# Patient Record
Sex: FEMALE | Race: WHITE | Employment: OTHER | URBAN - METROPOLITAN AREA
[De-identification: names, ages, dates, MRNs, and addresses within clinical notes are randomized per-mention and may not be internally consistent; named-entity substitution may affect disease eponyms.]

---

## 2020-08-12 ENCOUNTER — HOSPITAL ENCOUNTER (OUTPATIENT)
Dept: LAB | Age: 80
Discharge: HOME | End: 2020-08-12
Payer: MEDICARE

## 2020-08-12 LAB
ALBUMIN SERPL-MCNC: 3.4 G/DL (ref 3.4–5)
ALBUMIN/GLOB SERPL: 0.8 {RATIO} (ref 0.8–1.7)
ALP SERPL-CCNC: 173 U/L (ref 45–117)
ALT SERPL-CCNC: 49 U/L (ref 13–56)
ANION GAP SERPL CALC-SCNC: 1 MMOL/L (ref 3–18)
AST SERPL-CCNC: 37 U/L (ref 10–38)
BASOPHILS # BLD: 0 K/UL (ref 0–0.1)
BASOPHILS NFR BLD: 1 % (ref 0–2)
BILIRUB SERPL-MCNC: 0.4 MG/DL (ref 0.2–1)
BUN SERPL-MCNC: 18 MG/DL (ref 7–18)
BUN/CREAT SERPL: 18 (ref 12–20)
CALCIUM SERPL-MCNC: 9.6 MG/DL (ref 8.5–10.1)
CHLORIDE SERPL-SCNC: 100 MMOL/L (ref 100–111)
CO2 SERPL-SCNC: 37 MMOL/L (ref 21–32)
CREAT SERPL-MCNC: 1.01 MG/DL (ref 0.6–1.3)
DIFFERENTIAL METHOD BLD: ABNORMAL
EOSINOPHIL # BLD: 0.8 K/UL (ref 0–0.4)
EOSINOPHIL NFR BLD: 13 % (ref 0–5)
ERYTHROCYTE [DISTWIDTH] IN BLOOD BY AUTOMATED COUNT: 13.8 % (ref 11.6–14.5)
GLOBULIN SER CALC-MCNC: 4.4 G/DL (ref 2–4)
GLUCOSE SERPL-MCNC: 88 MG/DL (ref 74–99)
HCT VFR BLD AUTO: 41.3 % (ref 35–45)
HGB BLD-MCNC: 12.5 G/DL (ref 12–16)
INR PPP: 1 (ref 0.8–1.2)
LYMPHOCYTES # BLD: 1 K/UL (ref 0.9–3.6)
LYMPHOCYTES NFR BLD: 17 % (ref 21–52)
MCH RBC QN AUTO: 31.1 PG (ref 24–34)
MCHC RBC AUTO-ENTMCNC: 30.3 G/DL (ref 31–37)
MCV RBC AUTO: 102.7 FL (ref 74–97)
MONOCYTES # BLD: 0.7 K/UL (ref 0.05–1.2)
MONOCYTES NFR BLD: 11 % (ref 3–10)
NEUTS SEG # BLD: 3.4 K/UL (ref 1.8–8)
NEUTS SEG NFR BLD: 58 % (ref 40–73)
PLATELET # BLD AUTO: 164 K/UL (ref 135–420)
PMV BLD AUTO: 11.8 FL (ref 9.2–11.8)
POTASSIUM SERPL-SCNC: 4.1 MMOL/L (ref 3.5–5.5)
PROT SERPL-MCNC: 7.8 G/DL (ref 6.4–8.2)
PROTHROMBIN TIME: 13.5 SEC (ref 11.5–15.2)
RBC # BLD AUTO: 4.02 M/UL (ref 4.2–5.3)
SODIUM SERPL-SCNC: 138 MMOL/L (ref 136–145)
WBC # BLD AUTO: 5.9 K/UL (ref 4.6–13.2)

## 2020-08-12 PROCEDURE — 36415 COLL VENOUS BLD VENIPUNCTURE: CPT

## 2020-08-12 PROCEDURE — 85610 PROTHROMBIN TIME: CPT

## 2020-08-12 PROCEDURE — 80053 COMPREHEN METABOLIC PANEL: CPT

## 2020-08-12 PROCEDURE — 85025 COMPLETE CBC W/AUTO DIFF WBC: CPT

## 2020-08-17 RX ORDER — UMECLIDINIUM 62.5 UG/1
1 AEROSOL, POWDER ORAL DAILY
COMMUNITY

## 2020-08-17 RX ORDER — GLUCOSAMINE SULFATE 1500 MG
1000 POWDER IN PACKET (EA) ORAL DAILY
COMMUNITY

## 2020-08-17 RX ORDER — HYDROCORTISONE 25 MG/G
CREAM TOPICAL 2 TIMES DAILY
COMMUNITY

## 2020-08-18 ENCOUNTER — HOSPITAL ENCOUNTER (OUTPATIENT)
Age: 80
Setting detail: OUTPATIENT SURGERY
Discharge: HOME | End: 2020-08-18
Attending: INTERNAL MEDICINE | Admitting: INTERNAL MEDICINE
Payer: MEDICARE

## 2020-08-18 VITALS
TEMPERATURE: 97.7 F | HEIGHT: 59 IN | SYSTOLIC BLOOD PRESSURE: 167 MMHG | OXYGEN SATURATION: 97 % | RESPIRATION RATE: 18 BRPM | HEART RATE: 74 BPM | WEIGHT: 169 LBS | BODY MASS INDEX: 34.07 KG/M2 | DIASTOLIC BLOOD PRESSURE: 54 MMHG

## 2020-08-18 DIAGNOSIS — I20.9 ANGINA, CLASS III (HCC): ICD-10-CM

## 2020-08-18 DIAGNOSIS — R94.39 ABNORMAL STRESS TEST: ICD-10-CM

## 2020-08-18 PROCEDURE — 77030013797 HC KT TRNSDUC PRSSR EDWD -A: Performed by: INTERNAL MEDICINE

## 2020-08-18 PROCEDURE — C1769 GUIDE WIRE: HCPCS | Performed by: INTERNAL MEDICINE

## 2020-08-18 PROCEDURE — 77030029997 HC DEV COM RDL R BND TELE -B: Performed by: INTERNAL MEDICINE

## 2020-08-18 PROCEDURE — 93460 R&L HRT ART/VENTRICLE ANGIO: CPT | Performed by: INTERNAL MEDICINE

## 2020-08-18 PROCEDURE — C1894 INTRO/SHEATH, NON-LASER: HCPCS | Performed by: INTERNAL MEDICINE

## 2020-08-18 PROCEDURE — 99153 MOD SED SAME PHYS/QHP EA: CPT | Performed by: INTERNAL MEDICINE

## 2020-08-18 PROCEDURE — 74011636320 HC RX REV CODE- 636/320: Performed by: INTERNAL MEDICINE

## 2020-08-18 PROCEDURE — 99152 MOD SED SAME PHYS/QHP 5/>YRS: CPT | Performed by: INTERNAL MEDICINE

## 2020-08-18 PROCEDURE — 74011250636 HC RX REV CODE- 250/636: Performed by: INTERNAL MEDICINE

## 2020-08-18 PROCEDURE — 74011000250 HC RX REV CODE- 250: Performed by: INTERNAL MEDICINE

## 2020-08-18 PROCEDURE — 77030008543 HC TBNG MON PRSS MRTM -A: Performed by: INTERNAL MEDICINE

## 2020-08-18 PROCEDURE — C1751 CATH, INF, PER/CENT/MIDLINE: HCPCS | Performed by: INTERNAL MEDICINE

## 2020-08-18 PROCEDURE — 77030004522 HC CATH ANGI DX EXPO BSC -A: Performed by: INTERNAL MEDICINE

## 2020-08-18 RX ORDER — HEPARIN SODIUM 1000 [USP'U]/ML
INJECTION, SOLUTION INTRAVENOUS; SUBCUTANEOUS AS NEEDED
Status: DISCONTINUED | OUTPATIENT
Start: 2020-08-18 | End: 2020-08-18 | Stop reason: HOSPADM

## 2020-08-18 RX ORDER — FENTANYL CITRATE 50 UG/ML
INJECTION, SOLUTION INTRAMUSCULAR; INTRAVENOUS AS NEEDED
Status: DISCONTINUED | OUTPATIENT
Start: 2020-08-18 | End: 2020-08-18 | Stop reason: HOSPADM

## 2020-08-18 RX ORDER — LIDOCAINE HYDROCHLORIDE 10 MG/ML
INJECTION INFILTRATION; PERINEURAL AS NEEDED
Status: DISCONTINUED | OUTPATIENT
Start: 2020-08-18 | End: 2020-08-18 | Stop reason: HOSPADM

## 2020-08-18 RX ORDER — SODIUM CHLORIDE 9 MG/ML
75 INJECTION, SOLUTION INTRAVENOUS CONTINUOUS
Status: DISPENSED | OUTPATIENT
Start: 2020-08-18 | End: 2020-08-18

## 2020-08-18 RX ORDER — HEPARIN SODIUM 200 [USP'U]/100ML
INJECTION, SOLUTION INTRAVENOUS
Status: COMPLETED | OUTPATIENT
Start: 2020-08-18 | End: 2020-08-18

## 2020-08-18 RX ORDER — SODIUM CHLORIDE 0.9 % (FLUSH) 0.9 %
5-40 SYRINGE (ML) INJECTION AS NEEDED
Status: CANCELLED | OUTPATIENT
Start: 2020-08-18

## 2020-08-18 RX ORDER — VERAPAMIL HYDROCHLORIDE 2.5 MG/ML
INJECTION, SOLUTION INTRAVENOUS AS NEEDED
Status: DISCONTINUED | OUTPATIENT
Start: 2020-08-18 | End: 2020-08-18 | Stop reason: HOSPADM

## 2020-08-18 RX ORDER — SODIUM CHLORIDE 0.9 % (FLUSH) 0.9 %
5-40 SYRINGE (ML) INJECTION EVERY 8 HOURS
Status: CANCELLED | OUTPATIENT
Start: 2020-08-18

## 2020-08-18 RX ADMIN — SODIUM CHLORIDE 75 ML/HR: 900 INJECTION, SOLUTION INTRAVENOUS at 08:47

## 2020-08-18 NOTE — PROCEDURES
LHC and Coronary angiogram done via left radial approach. RHC done via right femoral vein approach. Coronary anatomy described below with noted coronary artery disease. Coronary angiogram revealed mild CAD with calcified coronaries. LV gram was not done. LVEDP 18-20 mm hg. No significant gradient on pull back. RHC revealed PCWP 23 mm hg, mean PA pressure 51 mm hg, RV pressure 84/2/17 mm hg and mean RA pressure 13 mm hg.    Cardiac out put by Ren's method 3.66 l/min and PVR 6.83 woods unit. Patient tolerated procedure well. Scant blood loss. No complications. No specimen removed. Recommendation:    Medication considered:   Aspirin, beta blocker, ACEI, Nitrates and statin     Will add diuretics. Continue oxygen. CAD risk factor education  Diet education  Control cholesterol  Blood pressure control  Exercise education: Age and functional status appropriate. Strongly advised to refrain from smoking.

## 2020-08-18 NOTE — Clinical Note
Bilateral groin and left radial clipped prepped with ChloraPrep and draped. Wet prep elapsed drying time: 5 mins.

## 2020-08-18 NOTE — H&P
Date of Surgery Update:  Tanesha Baez was seen and examined. History and physical has been reviewed. The patient has been examined. There have been no significant clinical changes since the completion of the originally dated History and Physical.      Patient assessed and is candidate for moderate sedation.       Signed By: Humera Hayes MD     August 18, 2020 10:41 AM

## 2020-08-18 NOTE — Clinical Note
TRANSFER - IN REPORT:     Verbal report received from: Vladislav Taylor. Report consisted of patient's Situation, Background, Assessment and   Recommendations(SBAR). Opportunity for questions and clarification was provided. Assessment completed upon patient's arrival to unit and care assumed. Patient transported with a Cardiac Cath Tech / Patient Care Tech.

## 2020-08-18 NOTE — DISCHARGE SUMMARY
Discharge Summary    Patient: Mi Haynes MRN: [id number]  CSN: [id number]    YOB: 1940  Age: 67 y.o. Sex: female    DOA: 8/18/2020 LOS:  LOS: 0 days   Discharge Date:      Primary Care Provider:  Dennis SALAZAR DO    Admission Diagnoses: Angina, class III ([address] [I20.9]  Abnormal stress test [R94.39]    Discharge Diagnoses:  CAD, Pulmonary hypertension     Discharge Condition: Stable    Discharge Medications:     Current Discharge Medication List      CONTINUE these medications which have NOT CHANGED    Details   cyanocobalamin, vitamin B-12, (VITAMIN B-12 SL) 1,000 mcg by SubLINGual route daily. cholecalciferol (Vitamin D3) 25 mcg (1,000 unit) cap Take 1,000 Units by mouth daily. allopurinol (ZYLOPRIM) 300 mg tablet Take 300 mg by mouth daily. Indications: GOUT      triamterene-hydrochlorothiazide (MAXZIDE) 37.5-25 mg per tablet Take  by mouth daily. Pt instructed to take am of surgery. Indications: HYPERTENSION      metoprolol (LOPRESSOR) 25 mg tablet Take 25 mg by mouth two (2) times a day. Pt instructed to take am of surgery. Indications: high blood pressure, succinate      verapamil SR (CALAN-SR) 240 mg CR tablet Take 240 mg by mouth daily. Pt instructed to take am of surgery. Indications: HYPERTENSION      aspirin delayed-release 81 mg tablet Take 81 mg by mouth daily. cloNIDine HCl (CATAPRES) 0.2 mg tablet Take 0.2 mg by mouth daily. Indications: HYPERTENSION      umeclidinium (Incruse Ellipta) 62.5 mcg/actuation inhaler Take 1 Puff by inhalation daily. hydrocortisone (Proctosol HC) 2.5 % rectal cream Insert  into rectum two (2) times a day. zolpidem (AMBIEN) 10 mg tablet Take 10 mg by mouth nightly as needed for Sleep. Indications: SLEEP-ONSET INSOMNIA      ezetimibe-simvastatin (VYTORIN 10/40) 10-40 mg per tablet Take 1 tablet by mouth nightly.  Indications: HYPERCHOLESTEROLEMIA             Procedures : LHC, RHC, coronary angiogram +/- PCI    Consults: None      PHYSICAL EXAM   Visit Vitals  /77 (BP 1 Location: Right arm, BP Patient Position: At rest)   Pulse 72   Temp 98.7 °F (37.1 °C)   Resp 22   Ht 4' 11\" (1.499 m)   Wt 76.7 kg (169 lb)   SpO2 91%   Breastfeeding No   BMI 34.13 kg/m²     General: Awake, cooperative, no acute distress    HEENT: NC, Atraumatic. PERRLA, EOMI. Anicteric sclerae. Lungs:  CTA Bilaterally. No Wheezing/Rhonchi/Rales. Heart:  Regular  rhythm,  No murmur, No Rubs, No Gallops  Abdomen: Soft, Non distended, Non tender. +Bowel sounds,   Extremities: No c/c/e  Psych:   Not anxious or agitated. Neurologic:  No acute neurological deficits. Admission HPI : See H/P    Hospital Course : Patient came for out patient LHC, RHC and coronary angiogram.  LHC and Coronary angiogram done via left radial approach. RHC done via right femoral vein approach. Coronary anatomy described below with noted coronary artery disease. Coronary angiogram revealed mild CAD with calcified coronaries. LV gram was not done. LVEDP 18-20 mm hg. No significant gradient on pull back. RHC revealed PCWP 23 mm hg, mean PA pressure 51 mm hg, RV pressure 84/2/17 mm hg and mean RA pressure 13 mm hg.    Cardiac out put by Ren's method 3.66 l/min and PVR 6.83 woods unit. Patient tolerated procedure well. Scant blood loss. No complications. No specimen removed. Recommendation:    Medication considered:   Aspirin, beta blocker, ACEI, Nitrates and statin     Will add diuretics. Continue oxygen. CAD risk factor education  Diet education  Control cholesterol  Blood pressure control  Exercise education: Age and functional status appropriate. Strongly advised to refrain from smoking. Activity: No driving for 24 hours, no weight lifting no more than 10 lbs from left hand for 1 week. Diet: Cardiac     Follow-up:  In cardiology clinic after 2 weeks    Disposition: Home    Minutes spent on discharge:41 minutes        Labs: Results:       Chemistry No results for input(s): GLU, NA, K, CL, CO2, BUN, CREA, CA, AGAP, BUCR, TBIL, AP, TP, ALB, GLOB, AGRAT in the last 72 hours. No lab exists for component: GPT   CBC w/Diff No results for input(s): WBC, RBC, HGB, HCT, PLT, GRANS, LYMPH, EOS, HGBEXT, HCTEXT, PLTEXT in the last 72 hours. Cardiac Enzymes No results for input(s): CPK, CKND1, BRENDAN in the last 72 hours. No lab exists for component: CKRMB, TROIP   Coagulation No results for input(s): PTP, INR, APTT, INREXT in the last 72 hours. Lipid Panel No results found for: CHOL, CHOLPOCT, CHOLX, CHLST, CHOLV, [id number], HDL, HDLP, LDL, LDLC, DLDLP, [id number], VLDLC, VLDL, TGLX, TRIGL, TRIGP, TGLPOCT, CHHD, CHHDX   BNP No results for input(s): BNPP in the last 72 hours. Liver Enzymes No results for input(s): TP, ALB, TBIL, AP in the last 72 hours. No lab exists for component: SGOT, GPT, DBIL   Thyroid Studies No results found for: T4, T3U, TSH, TSHEXT         Significant Diagnostic Studies: No results found.           CC: Shayy Rock DO

## 2020-08-18 NOTE — DISCHARGE INSTRUCTIONS
DISCHARGE SUMMARY from Nurse    PATIENT INSTRUCTIONS:    After general anesthesia or intravenous sedation, for 24 hours or while taking prescription Narcotics:  · Limit your activities  · Do not drive and operate hazardous machinery  · Do not make important personal or business decisions  · Do  not drink alcoholic beverages  · If you have not urinated within 8 hours after discharge, please contact your surgeon on call. Report the following to your surgeon:  · Excessive pain, swelling, redness or odor of or around the surgical area  · Temperature over 100.5  · Nausea and vomiting lasting longer than 4 hours or if unable to take medications  · Any signs of decreased circulation or nerve impairment to extremity: change in color, persistent  numbness, tingling, coldness or increase pain  · Any questions    What to do at Home:  Recommended activity:     If you experience any of the following symptoms , please follow up with Dr. Franci Zamorano. *  Please give a list of your current medications to your Primary Care Provider. *  Please update this list whenever your medications are discontinued, doses are      changed, or new medications (including over-the-counter products) are added. *  Please carry medication information at all times in case of emergency situations. These are general instructions for a healthy lifestyle:    No smoking/ No tobacco products/ Avoid exposure to second hand smoke  Surgeon General's Warning:  Quitting smoking now greatly reduces serious risk to your health.     Obesity, smoking, and sedentary lifestyle greatly increases your risk for illness    A healthy diet, regular physical exercise & weight monitoring are important for maintaining a healthy lifestyle    You may be retaining fluid if you have a history of heart failure or if you experience any of the following symptoms:  Weight gain of 3 pounds or more overnight or 5 pounds in a week, increased swelling in our hands or feet or shortness of breath while lying flat in bed. Please call your doctor as soon as you notice any of these symptoms; do not wait until your next office visit. The discharge information has been reviewed with the patient and spouse. The patient and caregiver verbalized understanding. Discharge medications reviewed with the patient and caregiver and appropriate educational materials and side effects teaching were provided. ___________________________________________________________________________________________________________________________________                 Cardiac Catheterization/Angiography Discharge Instructions    *Check the puncture site frequently for swelling or bleeding. If you see any bleeding, lie down and apply pressure over the area with a clean towel or washcloth. Notify your doctor for any redness, swelling, drainage or oozing from the puncture site. Notify your doctor for any fever or chills. *If the leg or arm with the puncture becomes cold, numb or painful, call Dr Calles     *Activity should be limited for the next 48 hours. Climb stairs as little as possible and avoid any stooping, bending or strenuous activity for 48 hours. No heavy lifting (anything over 10 pounds) for three days. *Do not drive for 24 hours. *You may resume your usual diet. Drink more fluids than usual.    *Have a responsible person drive you home and stay with you for at least 24 hours after your heart catheterization/angiography. Wear armboard rest of today & overnight, discard armboard in the morning. *You may remove the bandage from your Right, Left, Groin and Arm in 24 hours. You may shower in 24 hours. No tub baths, hot tubs or swimming for one week. Do not place any lotions, creams, powders, ointments over the puncture site for one week. You may place a clean band-aid over the puncture site each day for 5 days. Change this daily.       Patient armband removed and shredded

## 2020-08-18 NOTE — ROUTINE PROCESS
Cardiac Cath Lab:  Pre Procedure Chart Check Patients chart was accessed and reviewed for possible and/or scheduled procedure. Creatinine Clearance: 
Serum creatinine: 1.01 mg/dL 08/12/20 1523 Estimated creatinine clearance: 43.5 mL/min Total Contrast  Load: 
3 x estimated clearance amount=  130.5ml 
 
75% of Contrast Load: 0.75 x Total Contrast Load=    97.8ml No results for input(s): WBC, RBC, HCT, HGB, PLT, INR, APTT, PTP, NA, K, BUN, CREA, GFRAA, GFRNA, CA, MAG, CPK, CKMB, CKNDX, CKND1, TROPT, TROIQ, BNPP, BNP, HCTEXT, HGBEXT, PLTEXT, INREXT in the last 72 hours. No lab exists for component: DDIMER, GLUCOSE 
 
BMI: Body mass index is 34.13 kg/m². ALLERGIES:  
Allergies Allergen Reactions  Benicar [Olmesartan] Diarrhea and Nausea and Vomiting  Sulfa (Sulfonamide Antibiotics) Hives Lines: 
  
  
  
  
 
History: 
 
Past Medical History:  
Diagnosis Date  Arthritis  Chronic obstructive pulmonary disease [address] [address]  Gout  Hypercholesterolemia  Hypertension 1971  Ill-defined condition   
 shortness of breath with exercise  Tinnitus  Unspecified sleep apnea   
 does not use CPAP Past Surgical History:  
Procedure Laterality Date  HX BREAST BIOPSY Left  HX CATARACT REMOVAL Bilateral 2012  HX DILATION AND CURETTAGE    
 HX KNEE REPLACEMENT Left 2017 Patient Active Problem List  
Diagnosis Code  Unspecified cataract H26.9

## 2020-08-18 NOTE — PROGRESS NOTES
Back from procedure. TR band intact to left wrist & right femoral venous sheath intact. Pt flat, denies pain Diet given. 1245 Right femoral venous sheath removed, pressure held x 15 minutes, sterile 2x2 & Tegaderm applied. 1305 TR band released, sterile 2x2 & Tegaderm applied with armboard. Dr. Lizarraga Shone in.   1345 pt voided 200 ml on bedpan, Discharge instructions reviewed. 1430 Bedside report to OLIVIA Strange RN

## 2020-08-18 NOTE — Clinical Note
TRANSFER - OUT REPORT:     Verbal report given to: Michael. Report consisted of patient's Situation, Background, Assessment and   Recommendations(SBAR). Opportunity for questions and clarification was provided. Patient transported with a Cardiac Cath Tech / Patient Care Tech. Patient transported to: Care.

## 2020-08-18 NOTE — PROGRESS NOTES
Denies any CP at this time. Slowly  Adjusted HOB to 45 degree  W/o changes to R groin assessment. Left wrist with immobilizer in use at all times. Assisted pt from high cabrera position to sitting up at the edge of the bed w/o problem. O2 on at all times. Pt able to changed clothes with minimal assist . Stood up and ambulated w/o dizziness or CP. escorted via w/c will all her belongings. Face - face d/c instruction given to son- that includes diet, activity limitation, showering tomorrow and changing dressing with bandaid. Agreed with no driving, no signing documents. And no alcohol inatke today. F/u with Doctor Rodney Wayne.

## 2021-04-22 ENCOUNTER — TRANSCRIBE ORDER (OUTPATIENT)
Dept: REGISTRATION | Age: 81
End: 2021-04-22

## 2021-04-22 ENCOUNTER — HOSPITAL ENCOUNTER (OUTPATIENT)
Dept: LAB | Age: 81
Discharge: HOME | End: 2021-04-22
Payer: MEDICARE

## 2021-04-22 DIAGNOSIS — E78.00 PURE HYPERCHOLESTEROLEMIA: Primary | ICD-10-CM

## 2021-04-22 DIAGNOSIS — E78.00 PURE HYPERCHOLESTEROLEMIA: ICD-10-CM

## 2021-04-22 LAB
ALBUMIN SERPL-MCNC: 3.4 G/DL (ref 3.4–5)
ALBUMIN/GLOB SERPL: 1 {RATIO} (ref 0.8–1.7)
ALP SERPL-CCNC: 93 U/L (ref 45–117)
ALT SERPL-CCNC: 25 U/L (ref 13–56)
ANION GAP SERPL CALC-SCNC: 4 MMOL/L (ref 3–18)
AST SERPL-CCNC: 23 U/L (ref 10–38)
BILIRUB SERPL-MCNC: 0.7 MG/DL (ref 0.2–1)
BUN SERPL-MCNC: 20 MG/DL (ref 7–18)
BUN/CREAT SERPL: 18 (ref 12–20)
CALCIUM SERPL-MCNC: 10.1 MG/DL (ref 8.5–10.1)
CHLORIDE SERPL-SCNC: 103 MMOL/L (ref 100–111)
CHOLEST SERPL-MCNC: 274 MG/DL
CO2 SERPL-SCNC: 34 MMOL/L (ref 21–32)
CREAT SERPL-MCNC: 1.09 MG/DL (ref 0.6–1.3)
FAX TO INFO,FAXT: NORMAL
FAX TO NUMBER,FAXN: NORMAL
GLOBULIN SER CALC-MCNC: 3.4 G/DL (ref 2–4)
GLUCOSE SERPL-MCNC: 96 MG/DL (ref 74–99)
HDLC SERPL-MCNC: 52 MG/DL (ref 40–60)
HDLC SERPL: 5.3 {RATIO} (ref 0–5)
LDLC SERPL CALC-MCNC: 206.4 MG/DL (ref 0–100)
LIPID PROFILE,FLP: ABNORMAL
POTASSIUM SERPL-SCNC: 4.5 MMOL/L (ref 3.5–5.5)
PROT SERPL-MCNC: 6.8 G/DL (ref 6.4–8.2)
SODIUM SERPL-SCNC: 141 MMOL/L (ref 136–145)
TRIGL SERPL-MCNC: 78 MG/DL (ref ?–150)
VLDLC SERPL CALC-MCNC: 15.6 MG/DL

## 2021-04-22 PROCEDURE — 80053 COMPREHEN METABOLIC PANEL: CPT

## 2021-04-22 PROCEDURE — 36415 COLL VENOUS BLD VENIPUNCTURE: CPT

## 2021-04-22 PROCEDURE — 80061 LIPID PANEL: CPT

## 2021-11-10 ENCOUNTER — HOSPITAL ENCOUNTER (OUTPATIENT)
Dept: WOUND CARE | Age: 81
Discharge: HOME | End: 2021-11-10
Attending: HOSPITALIST
Payer: MEDICARE

## 2021-11-10 VITALS
SYSTOLIC BLOOD PRESSURE: 181 MMHG | OXYGEN SATURATION: 98 % | TEMPERATURE: 98.7 F | HEART RATE: 69 BPM | RESPIRATION RATE: 18 BRPM | DIASTOLIC BLOOD PRESSURE: 47 MMHG

## 2021-11-10 PROBLEM — S91.001A ANKLE WOUND, RIGHT, INITIAL ENCOUNTER: Status: ACTIVE | Noted: 2021-11-10

## 2021-11-10 PROCEDURE — 99211 OFF/OP EST MAY X REQ PHY/QHP: CPT

## 2021-11-10 RX ORDER — LIDOCAINE HYDROCHLORIDE 20 MG/ML
JELLY TOPICAL ONCE
Status: CANCELLED | OUTPATIENT
Start: 2021-11-10 | End: 2021-11-10

## 2021-11-10 RX ORDER — CLOBETASOL PROPIONATE 0.5 MG/G
OINTMENT TOPICAL ONCE
Status: CANCELLED | OUTPATIENT
Start: 2021-11-10 | End: 2021-11-10

## 2021-11-10 RX ORDER — LIDOCAINE 40 MG/G
CREAM TOPICAL ONCE
Status: CANCELLED | OUTPATIENT
Start: 2021-11-10 | End: 2021-11-10

## 2021-11-10 RX ORDER — LIDOCAINE HYDROCHLORIDE 40 MG/ML
SOLUTION TOPICAL ONCE
Status: CANCELLED | OUTPATIENT
Start: 2021-11-10 | End: 2021-11-10

## 2021-11-10 RX ORDER — MUPIROCIN 20 MG/G
OINTMENT TOPICAL ONCE
Status: CANCELLED | OUTPATIENT
Start: 2021-11-10 | End: 2021-11-10

## 2021-11-10 RX ORDER — BACITRACIN ZINC AND POLYMYXIN B SULFATE 500; 1000 [USP'U]/G; [USP'U]/G
OINTMENT TOPICAL ONCE
Status: CANCELLED | OUTPATIENT
Start: 2021-11-10 | End: 2021-11-10

## 2021-11-10 RX ORDER — LIDOCAINE 50 MG/G
OINTMENT TOPICAL ONCE
Status: CANCELLED | OUTPATIENT
Start: 2021-11-10 | End: 2021-11-10

## 2021-11-10 NOTE — WOUND CARE
11/10/21 1421   Wound Ankle Right 11/10/21   Date First Assessed/Time First Assessed: 11/10/21 1352   Present on Hospital Admission: Yes  Wound Approximate Age at First Assessment (Weeks): 3 weeks  Primary Wound Type: Blister/bullae  Location: Ankle  Wound Location Orientation: Right  Date of Fi. .. Wound Image     Wound Etiology Other (Comment)  (Blister (ruputure))   Dressing Status Intact; New drainage noted; Old drainage noted   Cleansed Irrigated with saline   Dressing/Treatment Alginate with Ag; Collagen; Roll gauze; Other (Comment)  (coban)   Dressing Change Due 11/17/21   Wound Length (cm) 6 cm   Wound Width (cm) 10 cm   Wound Depth (cm) 0.1 cm   Wound Surface Area (cm^2) 60 cm^2   Wound Volume (cm^3) 6 cm^3   Drainage Amount Small   Drainage Description Thin   Wound Odor None   Deisy-Wound/Incision Assessment Blanchable erythema;  Intact   Edges Attached edges   Wound Thickness Description Full thickness

## 2021-11-11 RX ORDER — EVOLOCUMAB 140 MG/ML
INJECTION, SOLUTION SUBCUTANEOUS
COMMUNITY

## 2021-11-11 NOTE — DISCHARGE INSTRUCTIONS
Discharge Instructions for  [address]  [address] [place], [facility]  [phone] Fax [phone]    NAME:  Roseann Adames  YOB: 1940  MEDICAL RECORD NUMBER:  [id number]  DATE:  11/10/2021    Wound Cleansing:   Do not scrub or use excessive force. Cleanse wound prior to applying a clean dressing with:  [] Normal Saline [] Keep Wound Dry in Shower    [x] Wound Cleanser   [] Cleanse wound with Mild Soap & Water  [] May Shower at Discharge   [] Other:      Topical Treatments:  Do not apply lotions, creams, or ointments to wound bed unless directed. [] Apply moisturizing lotion to skin surrounding the wound prior to dressing change.  [] Apply antifungal ointment to skin surrounding the wound prior to dressing change. [x] Apply thin film of moisture barrier ointment to skin immediately around wound. [] Other:       Dressings:           Wound Location ***   [x] Apply Primary Dressing:       [] MediHoney Gel [] Alginate with Silver [] Alginate   [] Collagen [x] Collagen with Silver   [] Santyl with Moisten saline gauze     [] Hydrocolloid   [] MediHoney Alginate [] Foam with Silver   [] Foam   [] Hydrofera Blue    [] Mepilex Border    [] Moisten with Saline [] Hydrogel [] Mepitel     [] Bactroban/Mupirocin [] Polysporin  [] Other:    [] Pack wound loosely with  [] Iodoform   [] Plain Packing  [] Other   [] Cover and Secure with:     [x] Gauze [x] Yelena [] Kerlix   [] Ace Wrap [] Cover Roll Tape [] ABD     [] Other:    Avoid contact of tape with skin.   [x] Change dressing: [] Daily    [] Every Other Day [] Three times per week   [x] Once a week [] Do Not Change Dressing   [x] Other: coban   Negative Pressure:           Wound Location:   [] Pressure@           mm/Hg  []Continuous []Intermittent   [] Black  [] White Foam [] Other:   []Change dressing: []Three times per week    []Other:     Pressure Relief:  [] When sitting, shift position or do seat lifts every 15 minutes.  [] Wheelchair cushion [] Specialty Bed/Mattress  [] Turn every 2 hours when in bed. Avoid position directing pressure on wound site. Limit side lying to 30 degree tilt. Limit HOB elevation to 30 degrees. Edema Control:  Apply: [] Compression Stocking []Right Leg []Left Leg   [] Tubigrip []Right Leg Double Layer []Left Leg Double Layer       []Right Leg Single Layer []Left Leg Single Layer   [] SpandaGrip []Right Leg  []Left Leg      []Low compression 5-10 mm/Hg      []Medium compression 10-20 mm/Hg     []High compression  20-30 mm/Hg   every morning immediately when getting up should be applied to affected leg(s) from mid foot to knee making sure to cover the heel. Remove every night before going to bed. [] Elevate leg(s) above the level of the heart when sitting. [] Avoid prolonged standing in one place. [] Elevate arm/hand above the level of the heart []RightArm []LeftArm     Compression:  Apply: [x] Multilayer Compression Wrap Applied in Clinic [x]RightLeg []Left Leg   [] Multi-layer compression. Do not get leg(s) with wrap wet. If wraps become too tight call the center or completely remove the wrap. [] Elevate leg(s) above the level of the heart when sitting. [x] Avoid prolonged standing in one place. Off-Loading:   [] Off-loading when [] walking  [] in bed [] sitting  [] Total non-weight bearing  [] Right Leg  [] Left Leg   [x] Assistive Device [] Walker [x] Telma Stone  [] Wheelchair  [] Crutches   [] Surgical shoe    [] Podus Boot(s)   [] Foam Boot(s)  [] Roll About    [] Cast Boot [] CROW Boot  [] Other:    Contact Cast:  Apply: [] Total Contact Cast Applied in Clinic []RightLeg []Left Leg   [] Do not get cast wet. Contact center or go to emergency room if there is a foul odor or becomes uncomfortable due to feeling tight or swelling. Do not use objects inside of cast to scratch.       Dietary:  [x] Diet as tolerated: [] Calorie Diabetic Diet: [] No Added Salt:  [] Increase Protein: [] Other:   Activity:  [x] Activity as tolerated:  [x] Patient has no activity restrictions     [] Strict Bedrest: [] Remain off Work:     [] May return to full duty work:                                   [] Return to work with restrictions:             Return Appointment:  [] Wound and dressing supply provider:   [] ECF or Home Healthcare:  [x] Wound Assessment: [x] Physician or NP scheduled for Wound Assessment:   [x] Return Appointment: 1 Week(s)  [] Ordered tests:      Electronically signed Ketty Sarah LPN on 88/93/0043 at 8:51 AM     Bertha Cha 281: Should you experience any significant changes in your wound(s) or have questions about your wound care, please contact the [facility] at [address] [place] [address] 8:00 am - 4:30. If you need help with your wound outside these hours and cannot wait until we are again available, contact your PCP or go to the hospital emergency room. PLEASE NOTE: IF YOU ARE UNABLE TO OBTAIN WOUND SUPPLIES, CONTINUE TO USE THE SUPPLIES YOU HAVE AVAILABLE UNTIL YOU ARE ABLE TO REACH US. IT IS MOST IMPORTANT TO KEEP THE WOUND COVERED AT ALL TIMES.      Physician Signature:_______________________    Date: ___________ Time:  ____________

## 2021-11-13 NOTE — WOUND CARE
[facility]  Initial Consult note         Chief Complaint:  Jordy So is a 79 y.o.  female who presents with right foot medial ankle wound. Referred by:  Dr. Cindy Amezcua    HPI:   She reports that she had itching of her right ankle and she scratched it hard with her other foot about 3 weeks ago. It became a blister. She completed 7 day course of antibiotic amoxacillin. Wound caused by: itching/rubbing  Current wound care: local wound care  Offloading wound: n/a  Appetite: good  Wound associated pain: mild  Diabetic: no  Smoker: no      Past Medical History:   Diagnosis Date    Arthritis     Chronic obstructive pulmonary disease (HCC)     Gout     Hypercholesterolemia     Hypertension 1971    Ill-defined condition     shortness of breath with exercise    Tinnitus     Unspecified sleep apnea     does not use CPAP      Past Surgical History:   Procedure Laterality Date    HX BREAST BIOPSY Left     HX CATARACT REMOVAL Bilateral 2012    HX DILATION AND CURETTAGE      HX KNEE REPLACEMENT Left 2017     Family History   Problem Relation Age of Onset    Stroke Mother       Social History     Tobacco Use    Smoking status: Former Smoker     Quit date: 10/1/1962     Years since quittin.1    Smokeless tobacco: Never Used   Substance Use Topics    Alcohol use: Not Currently       Prior to Admission medications    Medication Sig Start Date End Date Taking? Authorizing Provider   evolocumab (Repatha SureClick) pen injection    Yes Provider, Historical   cyanocobalamin, vitamin B-12, (VITAMIN B-12 SL) 1,000 mcg by SubLINGual route daily. Yes Provider, Historical   umeclidinium (Incruse Ellipta) 62.5 mcg/actuation inhaler Take 1 Puff by inhalation daily. Yes Provider, Historical   hydrocortisone (Proctosol HC) 2.5 % rectal cream Insert  into rectum two (2) times a day. Yes Provider, Historical   cholecalciferol (Vitamin D3) 25 mcg (1,000 unit) cap Take 1,000 Units by mouth daily.    Yes Provider, Historical   zolpidem (Ambien) 10 mg tablet Take 10 mg by mouth nightly as needed for Sleep. Indications: difficulty falling asleep   Yes Provider, Historical   allopurinol (ZYLOPRIM) 300 mg tablet Take 300 mg by mouth daily. Indications: GOUT   Yes Provider, Historical   triamterene-hydrochlorothiazide (MAXZIDE) 37.5-25 mg per tablet Take  by mouth daily. Pt instructed to take am of surgery. Indications: HYPERTENSION   Yes Provider, Historical   metoprolol (LOPRESSOR) 25 mg tablet Take 25 mg by mouth two (2) times a day. Pt instructed to take am of surgery. Indications: high blood pressure, succinate   Yes Provider, Historical   verapamil SR (CALAN-SR) 240 mg CR tablet Take 240 mg by mouth daily. Pt instructed to take am of surgery. Indications: HYPERTENSION   Yes Provider, Historical   aspirin delayed-release 81 mg tablet Take 81 mg by mouth daily. Yes Provider, Historical   ezetimibe-simvastatin (VYTORIN 10/40) 10-40 mg per tablet Take 1 tablet by mouth nightly. Indications: HYPERCHOLESTEROLEMIA   Yes Provider, Historical   cloNIDine HCl (CATAPRES) 0.2 mg tablet Take 0.2 mg by mouth daily. Indications: HYPERTENSION   Yes Provider, Historical     Allergies   Allergen Reactions    Benicar [Olmesartan] Diarrhea and Nausea and Vomiting    Sulfa (Sulfonamide Antibiotics) Hives        Review of Systems  Gen: No fever, chills, malaise, weight loss/gain. Heent: No headache, rhinorrhea, epistaxis, ear pain, hearing loss, sinus pain, neck pain/stiffness, sore throat. Heart: No chest pain, palpitations, OLVERA, pnd, or orthopnea. Resp: No cough, hemoptysis, wheezing and shortness of breath. GI: No nausea, vomiting, diarrhea, constipation, melena or hematochezia. : No urinary obstruction, dysuria or hematuria. Derm: see above   Musc/skeletal: no bone or joint complains. Vasc: No edema, cyanosis or claudication. Endo: No heat/cold intolerance, no polyuria,polydipsia or polyphagia. Neuro:  No unilateral weakness, numbness, tingling. No seizures. Heme: No easy bruising or bleeding. Objective:     Physical Exam:     Visit Vitals  BP (!) 181/47 (BP 1 Location: Left upper arm, BP Patient Position: At rest;Sitting)   Pulse 69   Temp 98.7 °F (37.1 °C)   Resp 18   SpO2 98%     General: well developed, well nourished, pleasant , NAD. Hygiene good  Psych: cooperative. Pleasant. No anxiety or depression. Normal mood and affect. Neuro: alert and oriented to person/place/situation. Otherwise nonfocal.  Derm: Skin turgor for age, dry skin  HEENT: Normocephalic, atraumatic. EOMI. Conjunctiva clear. No scleral icterus. Neck: Normal range of motion. No masses. Chest: Good air entry bilaterally. Respirations nonlabored  Cardio[de-identified] Normal heart sounds,no rubs, murmurs or gallops  Abdomen: Soft, nontender, nondistended, normoactive bowel sounds  Lower extremities: color normal; temperature normal. Calves are supple, nontender. Capillary refill <3 sec      Wound Description:   Wound Length:      Wound Width :     Wound Depth :     Wound Ankle Right 11/10/21   Date First Assessed/Time First Assessed: 11/10/21 1352   Present on Hospital Admission: Yes  Wound Approximate Age at First Assessment (Weeks): 3 weeks  Primary Wound Type: Blister/bullae  Location: Ankle  Wound Location Orientation: Right  Date of Fi. .. Wound Image                Wound Etiology Other (Comment)  (Blister (ruputure))   Dressing Status Intact; New drainage noted; Old drainage noted   Cleansed Irrigated with saline   Dressing/Treatment Alginate with Ag; Collagen; Roll gauze; Other (Comment)  (coban)   Dressing Change Due 11/17/21   Wound Length (cm) 6 cm   Wound Width (cm) 10 cm   Wound Depth (cm) 0.1 cm   Wound Surface Area (cm^2) 60 cm^2   Wound Volume (cm^3) 6 cm^3   Drainage Amount Small   Drainage Description Thin   Wound Odor None   Deisy-Wound/Incision Assessment Blanchable erythema;  Intact   Edges Attached edges   Wound Thickness Description Full thickness         Data Review:   No results found for this or any previous visit (from the past 24 hour(s)). Assessment:     Patient Active Problem List   Diagnosis Code    Unspecified cataract H26.9    Ankle wound, right, initial encounter S91.001A     79 y.o. female with open wound of right ankle    Needs :  Serial debridement as needed  Good local wound care  Plan:     In wound care clinic today:  Cleanse wound with NS or soap and water or commercial wound cleanser  Apply the following topically to wound bed: Acquacel Ag  Apply the following to keren-wound: NA  Apply the following dressings: Absorptive dressing    For Home Care/Self Care:  Cleanse wound with NS or soap and water or commercial wound cleanser  Keep dressing dry and intact when bathing  Apply the following to wound bed: Acquacel Ag  Apply the following to skin around wound: NA  Apply the following dressings: Absorptive dressing    Leave dressings in place until next visit    Patient to return for wound care in:  7 Days  Follow up with Nurse visit as recommended. PLEASE CONTACT OFFICE AS SOON AS POSSIBLE IF UNABLE TO MAKE THIS APPOINTMENT. Inspect your wounds, looking for signs of infection which may include the following:  Increase in redness  Red \"streaks\" from wound  Increase in swelling  Fever  Unusual odor  Change in the amount of wound drainage. Should you experience any significant changes in your wound(s) or have any questions regarding your home care instructions please contact the wound center or your home health company. If after regular business hours, please call your family doctor or local emergency room. Edema Control:   Elevate legs as much as possible. Avoid standing in one position for more than 10 minutes. Avoid setting with legs down. Do not cross legs while sitting. Off-Loading:     Frequent position changes. Do not cross legs while sitting.  Shift weight every 20 minutes or more when sitting for prolonged periods of time.   Signed By: Chelsey Mascorro MD     November 13, 2021

## 2021-11-17 ENCOUNTER — HOSPITAL ENCOUNTER (OUTPATIENT)
Dept: WOUND CARE | Age: 81
Discharge: HOME | End: 2021-11-17
Attending: HOSPITALIST
Payer: MEDICARE

## 2021-11-17 DIAGNOSIS — S91.001A ANKLE WOUND, RIGHT, INITIAL ENCOUNTER: Primary | ICD-10-CM

## 2021-11-17 PROCEDURE — 11042 DBRDMT SUBQ TIS 1ST 20SQCM/<: CPT

## 2021-11-17 RX ORDER — LIDOCAINE HYDROCHLORIDE 20 MG/ML
JELLY TOPICAL ONCE
Status: COMPLETED | OUTPATIENT
Start: 2021-11-17 | End: 2021-11-17

## 2021-11-17 RX ORDER — LIDOCAINE 40 MG/G
CREAM TOPICAL ONCE
Status: CANCELLED | OUTPATIENT
Start: 2021-11-17 | End: 2021-11-17

## 2021-11-17 RX ORDER — BACITRACIN ZINC AND POLYMYXIN B SULFATE 500; 1000 [USP'U]/G; [USP'U]/G
OINTMENT TOPICAL ONCE
Status: CANCELLED | OUTPATIENT
Start: 2021-11-17 | End: 2021-11-17

## 2021-11-17 RX ORDER — DOXYCYCLINE 100 MG/1
100 CAPSULE ORAL 2 TIMES DAILY
Qty: 20 CAPSULE | Refills: 0 | Status: SHIPPED | OUTPATIENT
Start: 2021-11-17 | End: 2021-11-27

## 2021-11-17 RX ORDER — LIDOCAINE HYDROCHLORIDE 40 MG/ML
SOLUTION TOPICAL ONCE
Status: CANCELLED | OUTPATIENT
Start: 2021-11-17 | End: 2021-11-17

## 2021-11-17 RX ORDER — LIDOCAINE HYDROCHLORIDE 20 MG/ML
JELLY TOPICAL ONCE
Status: CANCELLED | OUTPATIENT
Start: 2021-11-17 | End: 2021-11-17

## 2021-11-17 RX ORDER — LIDOCAINE 50 MG/G
OINTMENT TOPICAL ONCE
Status: CANCELLED | OUTPATIENT
Start: 2021-11-17 | End: 2021-11-17

## 2021-11-17 RX ORDER — CLOBETASOL PROPIONATE 0.5 MG/G
OINTMENT TOPICAL ONCE
Status: CANCELLED | OUTPATIENT
Start: 2021-11-17 | End: 2021-11-17

## 2021-11-17 RX ORDER — MUPIROCIN 20 MG/G
OINTMENT TOPICAL ONCE
Status: CANCELLED | OUTPATIENT
Start: 2021-11-17 | End: 2021-11-17

## 2021-11-17 RX ADMIN — LIDOCAINE HYDROCHLORIDE: 20 JELLY TOPICAL at 17:00

## 2021-11-17 NOTE — WOUND CARE
11/17/21 1557   Wound Ankle Right 11/10/21   Date First Assessed/Time First Assessed: 11/10/21 1352   Present on Hospital Admission: Yes  Wound Approximate Age at First Assessment (Weeks): 3 weeks  Primary Wound Type: Blister/bullae  Location: Ankle  Wound Location Orientation: Right  Date of Fi. .. Wound Image     Wound Etiology Other (Comment)  (rupture blister)   Dressing Status Dry; Intact;  Old drainage noted   Cleansed Irrigated with saline   Dressing/Treatment Collagen; Non-adherent   Dressing Change Due 11/24/21   Wound Length (cm) 2 cm   Wound Width (cm) 1.5 cm   Wound Depth (cm) 0.2 cm   Wound Surface Area (cm^2) 3 cm^2   Change in Wound Size % 95   Wound Volume (cm^3) 0.6 cm^3   Wound Healing % 90   Post-Procedure Length (cm) 2.1 cm   Post-Procedure Width (cm) 1.6 cm   Post-Procedure Depth (cm) 0.2 cm   Post-Procedure Surface Area (cm^2) 3.36 cm^2   Post-Procedure Volume (cm^3) 0.672 cm^3   Drainage Amount Scant   Drainage Description Thin   Wound Odor None   Deisy-Wound/Incision Assessment Blanchable erythema   Edges Attached edges   Wound Thickness Description Full thickness

## 2021-11-18 NOTE — DISCHARGE INSTRUCTIONS
Discharge Instructions for  [address]  [address], [facility]0 [facility]  [phone] Fax [phone]    NAME:  Marcelino Dance  YOB: 1940  MEDICAL RECORD NUMBER:  [id number]  DATE:  11/17/2021    Wound Cleansing:   Do not scrub or use excessive force. Cleanse wound prior to applying a clean dressing with:  [x] Normal Saline [] Keep Wound Dry in Shower    [x] Wound Cleanser   [] Cleanse wound with Mild Soap & Water  [] May Shower at Discharge   [] Other:      Topical Treatments:  Do not apply lotions, creams, or ointments to wound bed unless directed. [] Apply moisturizing lotion to skin surrounding the wound prior to dressing change.  [] Apply antifungal ointment to skin surrounding the wound prior to dressing change.  [] Apply thin film of moisture barrier ointment to skin immediately around wound. [] Other:       Dressings:           Wound Location ***   [x] Apply Primary Dressing:       [] MediHoney Gel [] Alginate with Silver [x] Alginate   [x] Collagen [] Collagen with Silver   [] Santyl with Moisten saline gauze     [] Hydrocolloid   [] MediHoney Alginate [] Foam with Silver   [] Foam   [] Hydrofera Blue    [] Mepilex Border    [] Moisten with Saline [] Hydrogel [] Mepitel     [] Bactroban/Mupirocin [] Polysporin  [] Other:    [] Pack wound loosely with  [] Iodoform   [] Plain Packing  [] Other   [x] Cover and Secure with:     [x] Gauze [x] Yelena [] Kerlix   [] Ace Wrap [] Cover Roll Tape [] ABD     [] Other:    Avoid contact of tape with skin.   [x] Change dressing: [] Daily    [] Every Other Day [] Three times per week   [x] Once a week [] Do Not Change Dressing   [] Other:     Negative Pressure:           Wound Location:   [] Pressure@           mm/Hg  []Continuous []Intermittent   [] Black  [] White Foam [] Other:   []Change dressing: []Three times per week    []Other:     Pressure Relief:  [x] When sitting, shift position or do seat lifts every 15 minutes.  [] Wheelchair cushion [] Specialty Bed/Mattress  [] Turn every 2 hours when in bed. Avoid position directing pressure on wound site. Limit side lying to 30 degree tilt. Limit HOB elevation to 30 degrees. Edema Control:  Apply: [] Compression Stocking [x]Right Leg []Left Leg   [] Tubigrip []Right Leg Double Layer []Left Leg Double Layer       []Right Leg Single Layer []Left Leg Single Layer   [] SpandaGrip []Right Leg  []Left Leg      []Low compression 5-10 mm/Hg      []Medium compression 10-20 mm/Hg     []High compression  20-30 mm/Hg   every morning immediately when getting up should be applied to affected leg(s) from mid foot to knee making sure to cover the heel. Remove every night before going to bed. [] Elevate leg(s) above the level of the heart when sitting. [] Avoid prolonged standing in one place. [] Elevate arm/hand above the level of the heart []RightArm []LeftArm     Compression:  Apply: [] Multilayer Compression Wrap Applied in Clinic []RightLeg []Left Leg   [] Multi-layer compression. Do not get leg(s) with wrap wet. If wraps become too tight call the center or completely remove the wrap. [] Elevate leg(s) above the level of the heart when sitting. [x] Avoid prolonged standing in one place. Off-Loading:   [x] Off-loading when [x] walking  [] in bed [x] sitting  [] Total non-weight bearing  [] Right Leg  [] Left Leg   [x] Assistive Device [x] Walker [] Cane  [x] Wheelchair  [] Crutches   [] Surgical shoe    [] Podus Boot(s)   [] Foam Boot(s)  [] Roll About    [] Cast Boot [] CROW Boot  [] Other:    Contact Cast:  Apply: [] Total Contact Cast Applied in Clinic []RightLeg []Left Leg   [] Do not get cast wet. Contact center or go to emergency room if there is a foul odor or becomes uncomfortable due to feeling tight or swelling. Do not use objects inside of cast to scratch.       Dietary:  [x] Diet as tolerated: [x] Calorie Diabetic Diet: [] No Added Salt:  [] Increase Protein: [] Other:   Activity:  [] Activity as tolerated:  [x] Patient has no activity restrictions     [] Strict Bedrest: [] Remain off Work:     [] May return to full duty work:                                   [] Return to work with restrictions:             Return Appointment:  [x] Wound and dressing supply provider:   [] ECF or Home Healthcare:  [x] Wound Assessment: [x] Physician or NP scheduled for Wound Assessment:   [x] Return Appointment:1Week(s)  [] Ordered tests:      Electronically signed Francisco Olivas LPN on 72/24/1307 at 4:24 PM     Bertha Cha 281: Should you experience any significant changes in your wound(s) or have questions about your wound care, please contact the [facility] at [address] [place] [address] 8:00 am - 4:30. If you need help with your wound outside these hours and cannot wait until we are again available, contact your PCP or go to the hospital emergency room. PLEASE NOTE: IF YOU ARE UNABLE TO OBTAIN WOUND SUPPLIES, CONTINUE TO USE THE SUPPLIES YOU HAVE AVAILABLE UNTIL YOU ARE ABLE TO REACH US. IT IS MOST IMPORTANT TO KEEP THE WOUND COVERED AT ALL TIMES.      Physician Signature:_______________________    Date: ___________ Time:  ____________

## 2021-11-23 NOTE — WOUND CARE
[address] [facility]  Follow up note. Chief Complaint:  Kaitlin Sinclair is a [de-identified] y.o.  female who presents for follow up of right foot medial ankle wound. She reports that she had itching of her right ankle and she scratched it hard with her other foot about 3 weeks ago. It became a blister. She completed 7 day course of antibiotic amoxacillin. Review of systems  General: No fevers or chills. Cardiovascular: No chest pain or pressure. No palpitations. Pulmonary: No shortness of breath. Gastrointestinal: No nausea, vomiting. Derm: See above    Physical Exam:     There were no vitals taken for this visit. General: well developed, well nourished, pleasant , NAD. Hygiene good  Psych: cooperative. Pleasant. No anxiety or depression. Normal mood and affect. Neuro: alert and oriented to person/place/situation. Otherwise nonfocal.  Derm: Skin turgor normal for age, dry skin  HEENT: Normocephalic, atraumatic. EOMI. Conjunctiva clear. No scleral icterus. Chest: Good air entry bilaterally. Respirations non labored  Cardio[de-identified] Normal heart sounds,no rubs, murmurs or gallops  Abdomen: Soft, nontender, nondistended, normoactive bowel sounds  Lower extremities: color normal; temperature normal. Calves are supple, nontender. Capillary refill <3 sec      Wound Description:   Wound Length:      Wound Width :     Wound Depth :     Wound Ankle Right 11/10/21   Date First Assessed/Time First Assessed: 11/10/21 1352   Present on Hospital Admission: Yes  Wound Approximate Age at First Assessment (Weeks): 3 weeks  Primary Wound Type: Blister/bullae  Location: Ankle  Wound Location Orientation: Right  Date of Fi. .. Wound Image                Wound Etiology Other (Comment)  (rupture blister)   Dressing Status Dry; Intact;  Old drainage noted   Cleansed Irrigated with saline   Dressing/Treatment Collagen; Non-adherent   Dressing Change Due 11/24/21   Wound Length (cm) 2 cm   Wound Width (cm) 1.5 cm   Wound Depth (cm) 0.2 cm Wound Surface Area (cm^2) 3 cm^2   Change in Wound Size % 95   Wound Volume (cm^3) 0.6 cm^3   Wound Healing % 90   Post-Procedure Length (cm) 2.1 cm   Post-Procedure Width (cm) 1.6 cm   Post-Procedure Depth (cm) 0.2 cm   Post-Procedure Surface Area (cm^2) 3.36 cm^2   Post-Procedure Volume (cm^3) 0.672 cm^3   Drainage Amount Scant   Drainage Description Thin   Wound Odor None   Deisy-Wound/Incision Assessment Blanchable erythema   Edges Attached edges   Wound Thickness Description Full thickness          Data Review:   No results found for this or any previous visit (from the past 24 hour(s)). Assessment:     Patient Active Problem List   Diagnosis Code    Unspecified cataract H26.9    Ankle wound, right, initial encounter S91.001A     [de-identified] y.o. female with open wound of right ankle. Prescribed doxycycline     Needs :  Serial debridement as needed  Good local wound care  Plan:     Informed consent obtained. Patient/family member explained about the need of the procedure. Consent in chart  Debridement:   Excisional debridement of right ankle ulcer   Indication: to remove necrotic tissue/ devitalized tissue/ soft eschar/ infected tissue through subcutaneous tissue epidermis and dermis layer of wound bed  Anesthesia: Topical 2% lidocaine jelly   Instrument: Curette, Blade,    Residual Necrosis: N/A  Bleeding: <1ml   Hemostasis: Pressure   Patient tolerated procedure well   Procedural Pain: mild  Post - procedural pain: mild    Post debridement measurements: 2.1 x 1.6 cm  Surface area debrided: 3.6 sq. cm      Follow up with RN in one week for dressing change.     Signed By: Rehan Barrientos MD     November 23, 2021

## 2021-11-24 ENCOUNTER — HOSPITAL ENCOUNTER (OUTPATIENT)
Dept: WOUND CARE | Age: 81
Discharge: HOME | End: 2021-11-24
Attending: HOSPITALIST
Payer: MEDICARE

## 2021-11-24 VITALS
SYSTOLIC BLOOD PRESSURE: 144 MMHG | DIASTOLIC BLOOD PRESSURE: 43 MMHG | RESPIRATION RATE: 18 BRPM | TEMPERATURE: 97.6 F | OXYGEN SATURATION: 96 % | HEART RATE: 69 BPM

## 2021-11-24 DIAGNOSIS — S91.001A ANKLE WOUND, RIGHT, INITIAL ENCOUNTER: Primary | ICD-10-CM

## 2021-11-24 PROCEDURE — 99212 OFFICE O/P EST SF 10 MIN: CPT

## 2021-11-24 RX ORDER — LIDOCAINE 40 MG/G
CREAM TOPICAL ONCE
Status: CANCELLED | OUTPATIENT
Start: 2021-11-24 | End: 2021-11-24

## 2021-11-24 RX ORDER — BACITRACIN ZINC AND POLYMYXIN B SULFATE 500; 1000 [USP'U]/G; [USP'U]/G
OINTMENT TOPICAL ONCE
Status: CANCELLED | OUTPATIENT
Start: 2021-11-24 | End: 2021-11-24

## 2021-11-24 RX ORDER — LIDOCAINE 50 MG/G
OINTMENT TOPICAL ONCE
Status: CANCELLED | OUTPATIENT
Start: 2021-11-24 | End: 2021-11-24

## 2021-11-24 RX ORDER — CLOBETASOL PROPIONATE 0.5 MG/G
OINTMENT TOPICAL ONCE
Status: CANCELLED | OUTPATIENT
Start: 2021-11-24 | End: 2021-11-24

## 2021-11-24 RX ORDER — MUPIROCIN 20 MG/G
OINTMENT TOPICAL ONCE
Status: CANCELLED | OUTPATIENT
Start: 2021-11-24 | End: 2021-11-24

## 2021-11-24 RX ORDER — LIDOCAINE HYDROCHLORIDE 20 MG/ML
JELLY TOPICAL ONCE
Status: CANCELLED | OUTPATIENT
Start: 2021-11-24 | End: 2021-11-24

## 2021-11-24 RX ORDER — LIDOCAINE HYDROCHLORIDE 40 MG/ML
SOLUTION TOPICAL ONCE
Status: CANCELLED | OUTPATIENT
Start: 2021-11-24 | End: 2021-11-24

## 2021-11-24 NOTE — DISCHARGE INSTRUCTIONS
Discharge Instructions for  [address]  [address], [facility]0 [facility]  [phone] Fax [phone]    NAME:  Geovanni Hdez  YOB: 1940  MEDICAL RECORD NUMBER:  [id number]  DATE:  11/24/2021    Wound Cleansing:   Do not scrub or use excessive force. Cleanse wound prior to applying a clean dressing with:  [x] Normal Saline [] Keep Wound Dry in Shower    [] Wound Cleanser   [] Cleanse wound with Mild Soap & Water  [] May Shower at Discharge   [] Other:      Topical Treatments:  Do not apply lotions, creams, or ointments to wound bed unless directed. [] Apply moisturizing lotion to skin surrounding the wound prior to dressing change.  [] Apply antifungal ointment to skin surrounding the wound prior to dressing change.  [] Apply thin film of moisture barrier ointment to skin immediately around wound. [] Other:       Dressings:           Wound Location ***   [x] Apply Primary Dressing:       [] MediHoney Gel [x] Alginate with Silver [] Alginate   [] Collagen [] Collagen with Silver   [] Santyl with Moisten saline gauze     [] Hydrocolloid   [] MediHoney Alginate [] Foam with Silver   [] Foam   [] Hydrofera Blue    [] Mepilex Border    [] Moisten with Saline [] Hydrogel [] Mepitel     [] Bactroban/Mupirocin [] Polysporin  [x] Other:  Silicone border  [] Pack wound loosely with  [] Iodoform   [] Plain Packing  [] Other   [x] Cover and Secure with:     [x] Gauze [x] Yelena [] Kerlix   [] Ace Wrap [] Cover Roll Tape [] ABD     [] Other:    Avoid contact of tape with skin.   [] Change dressing: [] Daily    [] Every Other Day [] Three times per week   [] Once a week [] Do Not Change Dressing   [] Other:     Negative Pressure:           Wound Location:   [] Pressure@           mm/Hg  []Continuous []Intermittent   [] Black  [] White Foam [] Other:   []Change dressing: []Three times per week    []Other:     Pressure Relief:  [] When sitting, shift position or do seat lifts every 15 minutes.  [] Wheelchair cushion [] Specialty Bed/Mattress  [] Turn every 2 hours when in bed. Avoid position directing pressure on wound site. Limit side lying to 30 degree tilt. Limit HOB elevation to 30 degrees. Edema Control:  Apply: [] Compression Stocking []Right Leg []Left Leg   [x] Tubigrip(F) []Right Leg Double Layer []Left Leg Double Layer       []Right Leg Single Layer []Left Leg Single Layer   [] SpandaGrip []Right Leg  []Left Leg      []Low compression 5-10 mm/Hg      []Medium compression 10-20 mm/Hg     []High compression  20-30 mm/Hg   every morning immediately when getting up should be applied to affected leg(s) from mid foot to knee making sure to cover the heel. Remove every night before going to bed. [] Elevate leg(s) above the level of the heart when sitting. [x] Avoid prolonged standing in one place. [] Elevate arm/hand above the level of the heart []RightArm []LeftArm     Compression:  Apply: [] Multilayer Compression Wrap Applied in Clinic []RightLeg []Left Leg   [] Multi-layer compression. Do not get leg(s) with wrap wet. If wraps become too tight call the center or completely remove the wrap. [] Elevate leg(s) above the level of the heart when sitting. [] Avoid prolonged standing in one place. Off-Loading:   [] Off-loading when [] walking  [] in bed [] sitting  [] Total non-weight bearing  [] Right Leg  [] Left Leg   [] Assistive Device [] Walker [] Cane  [] Wheelchair  [] Crutches   [] Surgical shoe    [] Podus Boot(s)   [] Foam Boot(s)  [] Roll About    [] Cast Boot [] CROW Boot  [] Other:    Contact Cast:  Apply: [] Total Contact Cast Applied in Clinic []RightLeg []Left Leg   [] Do not get cast wet. Contact center or go to emergency room if there is a foul odor or becomes uncomfortable due to feeling tight or swelling. Do not use objects inside of cast to scratch.       Dietary:  [x] Diet as tolerated: [] Calorie Diabetic Diet: [] No Added Salt:  [] Increase Protein: [] Other:   Activity:  [] Activity as tolerated:  [] Patient has no activity restrictions     [] Strict Bedrest: [] Remain off Work:     [] May return to full duty work:                                   [] Return to work with restrictions:             Return Appointment:  [x] Wound and dressing supply provider:   [] ECF or Home Healthcare:  [x] Wound Assessment: [x] Physician or NP scheduled for Wound Assessment:   [x] Return Appointment: 2 Week(s)  [] Ordered tests:      Electronically signed Torri Jacobs LPN on 42/07/7149 at 12:03 PM     Bertha Cha 281: Should you experience any significant changes in your wound(s) or have questions about your wound care, please contact the [address] [place] [address] at [address] [place] [address] 8:00 am - 4:30. If you need help with your wound outside these hours and cannot wait until we are again available, contact your PCP or go to the hospital emergency room. PLEASE NOTE: IF YOU ARE UNABLE TO OBTAIN WOUND SUPPLIES, CONTINUE TO USE THE SUPPLIES YOU HAVE AVAILABLE UNTIL YOU ARE ABLE TO REACH US. IT IS MOST IMPORTANT TO KEEP THE WOUND COVERED AT ALL TIMES.      Physician Signature:_______________________    Date: ___________ Time:  ____________

## 2021-12-08 ENCOUNTER — HOSPITAL ENCOUNTER (OUTPATIENT)
Dept: WOUND CARE | Age: 81
Discharge: HOME | End: 2021-12-08
Attending: HOSPITALIST
Payer: MEDICARE

## 2021-12-08 VITALS
HEART RATE: 58 BPM | OXYGEN SATURATION: 98 % | DIASTOLIC BLOOD PRESSURE: 46 MMHG | TEMPERATURE: 97.6 F | RESPIRATION RATE: 16 BRPM | SYSTOLIC BLOOD PRESSURE: 159 MMHG

## 2021-12-08 DIAGNOSIS — S91.001A ANKLE WOUND, RIGHT, INITIAL ENCOUNTER: Primary | ICD-10-CM

## 2021-12-08 PROCEDURE — 99211 OFF/OP EST MAY X REQ PHY/QHP: CPT

## 2021-12-08 RX ORDER — LIDOCAINE 40 MG/G
CREAM TOPICAL ONCE
OUTPATIENT
Start: 2021-12-08 | End: 2021-12-08

## 2021-12-08 RX ORDER — LIDOCAINE 50 MG/G
OINTMENT TOPICAL ONCE
OUTPATIENT
Start: 2021-12-08 | End: 2021-12-08

## 2021-12-08 RX ORDER — BACITRACIN ZINC AND POLYMYXIN B SULFATE 500; 1000 [USP'U]/G; [USP'U]/G
OINTMENT TOPICAL ONCE
OUTPATIENT
Start: 2021-12-08 | End: 2021-12-08

## 2021-12-08 RX ORDER — LIDOCAINE HYDROCHLORIDE 20 MG/ML
JELLY TOPICAL ONCE
OUTPATIENT
Start: 2021-12-08 | End: 2021-12-08

## 2021-12-08 RX ORDER — MUPIROCIN 20 MG/G
OINTMENT TOPICAL ONCE
OUTPATIENT
Start: 2021-12-08 | End: 2021-12-08

## 2021-12-08 RX ORDER — LIDOCAINE HYDROCHLORIDE 40 MG/ML
SOLUTION TOPICAL ONCE
OUTPATIENT
Start: 2021-12-08 | End: 2021-12-08

## 2021-12-08 RX ORDER — CLOBETASOL PROPIONATE 0.5 MG/G
OINTMENT TOPICAL ONCE
OUTPATIENT
Start: 2021-12-08 | End: 2021-12-08

## 2021-12-09 NOTE — WOUND CARE
[address] [facility]  Follow up note. Chief Complaint:  Vaishali Taylor is a [de-identified] y.o.  female who presents for follow up of right foot medial ankle wound. She reports that she had itching of her right ankle and she scratched it hard with her other foot about 3 weeks ago. It became a blister. She completed 7 day course of antibiotic amoxacillin. Review of systems  General: No fevers or chills. Cardiovascular: No chest pain or pressure. No palpitations. Pulmonary: No shortness of breath. Gastrointestinal: No nausea, vomiting. Derm: See above    Physical Exam:     Visit Vitals  BP (!) 159/46 (BP 1 Location: Left upper arm, BP Patient Position: At rest;Sitting)   Pulse (!) 58   Temp 97.6 °F (36.4 °C)   Resp 16   SpO2 98%     General: well developed, well nourished, pleasant , NAD. Hygiene good  Psych: cooperative. Pleasant. No anxiety or depression. Normal mood and affect. Neuro: alert and oriented to person/place/situation. Otherwise nonfocal.  Derm: Skin turgor normal for age, dry skin  HEENT: Normocephalic, atraumatic. EOMI. Conjunctiva clear. No scleral icterus. Chest: Good air entry bilaterally. Respirations non labored  Cardio[de-identified] Normal heart sounds,no rubs, murmurs or gallops  Abdomen: Soft, nontender, nondistended, normoactive bowel sounds  Lower extremities: color normal; temperature normal. Calves are supple, nontender. Capillary refill <3 sec      Wound Description:   Wound Length:      Wound Width :     Wound Depth :         Data Review:   No results found for this or any previous visit (from the past 24 hour(s)). Assessment:     Patient Active Problem List   Diagnosis Code    Unspecified cataract H26.9    Ankle wound, right, initial encounter S91.001A     [de-identified] y.o. female with open wound of right ankle. Completed course of doxycycline  Now healed.     Plan:   Follow up as needed    Signed By: Rehan Barrientos MD     December 9, 2021

## 2022-03-19 PROBLEM — S91.001A ANKLE WOUND, RIGHT, INITIAL ENCOUNTER: Status: ACTIVE | Noted: 2021-11-10

## 2022-06-13 ENCOUNTER — HOSPITAL ENCOUNTER (OUTPATIENT)
Dept: LAB | Age: 82
Discharge: HOME | End: 2022-06-13
Payer: MEDICARE

## 2022-06-13 LAB
ALBUMIN SERPL-MCNC: 3.6 G/DL (ref 3.4–5)
ALBUMIN/GLOB SERPL: 1.2 {RATIO} (ref 0.8–1.7)
ALP SERPL-CCNC: 86 U/L (ref 45–117)
ALT SERPL-CCNC: 21 U/L (ref 13–56)
ANION GAP SERPL CALC-SCNC: 5 MMOL/L (ref 3–18)
AST SERPL-CCNC: 17 U/L (ref 10–38)
BILIRUB SERPL-MCNC: 0.5 MG/DL (ref 0.2–1)
BUN SERPL-MCNC: 24 MG/DL (ref 7–18)
BUN/CREAT SERPL: 23 (ref 12–20)
CALCIUM SERPL-MCNC: 9.6 MG/DL (ref 8.5–10.1)
CHLORIDE SERPL-SCNC: 101 MMOL/L (ref 100–111)
CHOLEST SERPL-MCNC: 137 MG/DL
CO2 SERPL-SCNC: 36 MMOL/L (ref 21–32)
CREAT SERPL-MCNC: 1.04 MG/DL (ref 0.6–1.3)
GLOBULIN SER CALC-MCNC: 3.1 G/DL (ref 2–4)
GLUCOSE SERPL-MCNC: 90 MG/DL (ref 74–99)
HDLC SERPL-MCNC: 54 MG/DL (ref 40–60)
HDLC SERPL: 2.5 {RATIO} (ref 0–5)
LDLC SERPL CALC-MCNC: 68 MG/DL (ref 0–100)
LIPID PROFILE,FLP: NORMAL
POTASSIUM SERPL-SCNC: 4 MMOL/L (ref 3.5–5.5)
PROT SERPL-MCNC: 6.7 G/DL (ref 6.4–8.2)
SODIUM SERPL-SCNC: 142 MMOL/L (ref 136–145)
TRIGL SERPL-MCNC: 75 MG/DL (ref ?–150)
VLDLC SERPL CALC-MCNC: 15 MG/DL

## 2022-06-13 PROCEDURE — 80053 COMPREHEN METABOLIC PANEL: CPT

## 2022-06-13 PROCEDURE — 80061 LIPID PANEL: CPT

## 2022-06-13 PROCEDURE — 36415 COLL VENOUS BLD VENIPUNCTURE: CPT

## 2022-06-14 LAB
FAX TO INFO,FAXT: NORMAL
FAX TO NUMBER,FAXN: NORMAL

## 2022-12-20 ENCOUNTER — HOSPITAL ENCOUNTER (OUTPATIENT)
Dept: LAB | Age: 82
Discharge: HOME | End: 2022-12-20
Payer: MEDICARE

## 2022-12-20 LAB
ALBUMIN SERPL-MCNC: 3.8 G/DL (ref 3.4–5)
ALBUMIN/GLOB SERPL: 1.4 {RATIO} (ref 0.8–1.7)
ALP SERPL-CCNC: 72 U/L (ref 45–117)
ALT SERPL-CCNC: 63 U/L (ref 13–56)
ANION GAP SERPL CALC-SCNC: 3 MMOL/L (ref 3–18)
AST SERPL-CCNC: 49 U/L (ref 10–38)
BILIRUB SERPL-MCNC: 0.5 MG/DL (ref 0.2–1)
BUN SERPL-MCNC: 19 MG/DL (ref 7–18)
BUN/CREAT SERPL: 18 (ref 12–20)
CALCIUM SERPL-MCNC: 9 MG/DL (ref 8.5–10.1)
CHLORIDE SERPL-SCNC: 97 MMOL/L (ref 100–111)
CHOLEST SERPL-MCNC: 110 MG/DL
CO2 SERPL-SCNC: 38 MMOL/L (ref 21–32)
CREAT SERPL-MCNC: 1.06 MG/DL (ref 0.6–1.3)
GLOBULIN SER CALC-MCNC: 2.8 G/DL (ref 2–4)
GLUCOSE SERPL-MCNC: 103 MG/DL (ref 74–99)
HDLC SERPL-MCNC: 47 MG/DL (ref 40–60)
HDLC SERPL: 2.3 {RATIO} (ref 0–5)
LDLC SERPL CALC-MCNC: 43.4 MG/DL (ref 0–100)
LIPID PROFILE,FLP: NORMAL
POTASSIUM SERPL-SCNC: 3.3 MMOL/L (ref 3.5–5.5)
PROT SERPL-MCNC: 6.6 G/DL (ref 6.4–8.2)
SODIUM SERPL-SCNC: 138 MMOL/L (ref 136–145)
TRIGL SERPL-MCNC: 98 MG/DL (ref ?–150)
VLDLC SERPL CALC-MCNC: 19.6 MG/DL

## 2022-12-20 PROCEDURE — 80053 COMPREHEN METABOLIC PANEL: CPT

## 2022-12-20 PROCEDURE — 80061 LIPID PANEL: CPT

## 2022-12-20 PROCEDURE — 36415 COLL VENOUS BLD VENIPUNCTURE: CPT

## 2022-12-21 LAB
FAX TO INFO,FAXT: NORMAL
FAX TO NUMBER,FAXN: NORMAL

## 2023-02-22 ENCOUNTER — HOSPITAL ENCOUNTER (OUTPATIENT)
Facility: HOSPITAL | Age: 83
Discharge: HOME | End: 2023-02-22
Payer: MEDICARE

## 2023-02-22 VITALS
RESPIRATION RATE: 18 BRPM | SYSTOLIC BLOOD PRESSURE: 121 MMHG | DIASTOLIC BLOOD PRESSURE: 60 MMHG | HEART RATE: 84 BPM | TEMPERATURE: 98.6 F

## 2023-02-22 DIAGNOSIS — R60.9 EDEMA, UNSPECIFIED TYPE: ICD-10-CM

## 2023-02-22 DIAGNOSIS — S90.821A BLISTER OF RIGHT FOOT, INITIAL ENCOUNTER: ICD-10-CM

## 2023-02-22 PROCEDURE — 99213 OFFICE O/P EST LOW 20 MIN: CPT

## 2023-02-22 RX ORDER — LIDOCAINE HYDROCHLORIDE 20 MG/ML
JELLY TOPICAL ONCE
OUTPATIENT
Start: 2023-02-22 | End: 2023-02-22

## 2023-02-22 RX ORDER — GENTAMICIN SULFATE 1 MG/G
OINTMENT TOPICAL ONCE
OUTPATIENT
Start: 2023-02-22 | End: 2023-02-22

## 2023-02-22 RX ORDER — LIDOCAINE 40 MG/G
CREAM TOPICAL ONCE
OUTPATIENT
Start: 2023-02-22 | End: 2023-02-22

## 2023-02-22 RX ORDER — LIDOCAINE 50 MG/G
OINTMENT TOPICAL ONCE
OUTPATIENT
Start: 2023-02-22 | End: 2023-02-22

## 2023-02-22 RX ORDER — BACITRACIN, NEOMYCIN, POLYMYXIN B 400; 3.5; 5 [USP'U]/G; MG/G; [USP'U]/G
OINTMENT TOPICAL ONCE
OUTPATIENT
Start: 2023-02-22 | End: 2023-02-22

## 2023-02-22 RX ORDER — BETAMETHASONE DIPROPIONATE 0.05 %
OINTMENT (GRAM) TOPICAL ONCE
OUTPATIENT
Start: 2023-02-22 | End: 2023-02-22

## 2023-02-22 RX ORDER — LIDOCAINE HYDROCHLORIDE 40 MG/ML
SOLUTION TOPICAL ONCE
OUTPATIENT
Start: 2023-02-22 | End: 2023-02-22

## 2023-02-22 RX ORDER — CLOBETASOL PROPIONATE 0.5 MG/G
OINTMENT TOPICAL ONCE
OUTPATIENT
Start: 2023-02-22 | End: 2023-02-22

## 2023-02-22 RX ORDER — GINSENG 100 MG
CAPSULE ORAL ONCE
OUTPATIENT
Start: 2023-02-22 | End: 2023-02-22

## 2023-02-22 RX ORDER — BACITRACIN ZINC AND POLYMYXIN B SULFATE 500; 1000 [USP'U]/G; [USP'U]/G
OINTMENT TOPICAL ONCE
OUTPATIENT
Start: 2023-02-22 | End: 2023-02-22

## 2023-02-22 ASSESSMENT — PAIN SCALES - GENERAL: PAINLEVEL_OUTOF10: 0

## 2023-02-23 RX ORDER — FLUTICASONE FUROATE, UMECLIDINIUM BROMIDE AND VILANTEROL TRIFENATATE 100; 62.5; 25 UG/1; UG/1; UG/1
POWDER RESPIRATORY (INHALATION)
COMMUNITY
Start: 2023-02-07

## 2023-02-23 RX ORDER — CLONIDINE HYDROCHLORIDE 0.2 MG/1
TABLET ORAL
COMMUNITY
Start: 2023-01-27

## 2023-02-23 RX ORDER — METOPROLOL TARTRATE 100 MG/1
100 TABLET ORAL 2 TIMES DAILY
COMMUNITY
Start: 2022-12-29

## 2023-02-23 RX ORDER — ASPIRIN 81 MG/1
1 TABLET ORAL DAILY
COMMUNITY

## 2023-02-23 RX ORDER — ALLOPURINOL 300 MG/1
TABLET ORAL
COMMUNITY
Start: 2023-01-27

## 2023-02-23 RX ORDER — APIXABAN 5 MG/1
TABLET, FILM COATED ORAL
COMMUNITY
Start: 2023-01-22

## 2023-02-23 RX ORDER — DILTIAZEM HYDROCHLORIDE 240 MG/1
CAPSULE, COATED, EXTENDED RELEASE ORAL
COMMUNITY
Start: 2022-12-29

## 2023-02-23 RX ORDER — SPIRONOLACTONE 25 MG/1
TABLET ORAL
COMMUNITY
Start: 2023-01-31

## 2023-02-23 RX ORDER — FUROSEMIDE 40 MG/1
TABLET ORAL
COMMUNITY
Start: 2022-12-12

## 2023-02-23 RX ORDER — URSODIOL 500 MG/1
TABLET, FILM COATED ORAL
COMMUNITY
Start: 2022-04-21

## 2023-02-23 NOTE — FLOWSHEET NOTE
02/22/23 1522   Wound 02/22/23 Foot Anterior;Right Blister   Date First Assessed/Time First Assessed: 02/22/23 1521   Present on Hospital Admission: Yes  Primary Wound Type: (c) Other (comment)  Location: Foot  Wound Location Orientation: Anterior;Right  Wound Description (Comments): Blister   Wound Image    Wound Etiology Arterial   Dressing Status New dressing applied   Wound Cleansed Wound cleanser   Dressing/Treatment Xeroform;Dry dressing;Roll gauze; Tubular bandage   Offloading for Diabetic Foot Ulcers Offloading not required   Dressing Change Due 03/02/23   Wound Length (cm) 2 cm   Wound Width (cm) 4.6 cm   Wound Depth (cm)   (intact blister)   Wound Surface Area (cm^2) 9.2 cm^2   Wound Assessment   (intact blister)   Drainage Amount Small   Drainage Description Serous   Odor None   Dali-wound Assessment Fragile   Margins Attached edges   Wound Follow Up   Require Follow Up Yes

## 2023-02-23 NOTE — DISCHARGE INSTRUCTIONS
Discharge Instructions from  [facility]  [address] [place] [address], [facility]  [phone] Fax [phone]    NAME:  Chantal Dejesus  YOB: 1940  MEDICAL RECORD NUMBER:  [id number]  DATE:  2/22/23    Wound Cleansing:   Do not scrub or use excessive force. Cleanse wound prior to applying a clean dressing with:  [] Normal Saline [] Keep Wound Dry in Shower    [] Wound Cleanser   [] Cleanse wound with Mild Soap & Water  [] May Shower at Discharge   [] Other:      Topical Treatments:  Do not apply lotions, creams, or ointments to wound bed unless directed. [] Apply moisturizing lotion to skin surrounding the wound prior to dressing change.  [] Apply antifungal ointment to skin surrounding the wound prior to dressing change.  [] Apply thin film of moisture barrier ointment to skin immediately around wound. [] Other:       Dressings:           Wound Location Right foot   [] Apply Primary Dressing:       [] MediHoney Gel [] Alginate with Silver [] Alginate   [] Collagen [] Collagen with Silver   [] Santyl with Moisten saline gauze     [] Hydrocolloid   [] MediHoney Alginate [] Foam with Silver   [] Foam   [] Hydrofera Blue    [] Mepilex Border    [] Moisten with Saline [] Hydrogel [] Mepitel     [] Bactroban/Mupirocin [] Polysporin  [] Other:    [] Pack wound loosely with  [] Iodoform   [] Plain Packing  [] Other   [] Cover and Secure with:     [] Gauze [] Vilma [] Kerlix   [] Ace Wrap [] Cover Roll Tape [] ABD     [] Other:    Avoid contact of tape with skin.   [] Change dressing: [] Daily    [] Every Other Day [] Three times per week   [] Once a week [x] Do Not Change Dressing   [] Other:            Edema Control:  Apply: [] Compression Stocking []Right Leg []Left Leg   [x] Tubigrip []Right Leg Double Layer []Left Leg Double Layer       [x]Right Leg Single Layer [x]Left Leg Single Layer   [] SpandaGrip []Right Leg  []Left Leg      []Low compression 5-10 mm/Hg      []Medium compression 10-20 mm/Hg     []High compression  20-30 mm/Hg   every morning immediately when getting up should be applied to affected leg(s) from mid foot to knee making sure to cover the heel. Remove every night before going to bed. [x] Elevate leg(s) above the level of the heart when sitting. [x] Avoid prolonged standing in one place. Dietary:  [x] Diet as tolerated: [] Calorie Diabetic Diet: [] No Added Salt:  [] Increase Protein: [] Other:       Activity:  [x] Activity as tolerated:  [] Patient has no activity restrictions     [] Strict Bedrest: [] Remain off Work:     [] May return to full duty work:                                   [] Return to work with restrictions:             Return Appointment:  [] Wound and dressing supply provider:   [] ECF or Home Healthcare:  [] Wound Assessment: [] Physician or NP scheduled for Wound Assessment:   [x] Return Appointment: With Dr. Krystyna Lazcano  in  [address] [place] [address])  [] Ordered tests:      Electronically signed Tripp Jordan RN on 2/22/23 at 4:00 PM    [address] [place] [address] Information: Should you experience any significant changes in your wound(s) or have questions about your wound care, please contact the [facility] at [address] [place] [address] 8:00 am - 4:30. If you need help with your wound outside these hours and cannot wait until we are again available, contact your PCP or go to the hospital emergency room. PLEASE NOTE: IF YOU ARE UNABLE TO OBTAIN WOUND SUPPLIES, CONTINUE TO USE THE SUPPLIES YOU HAVE AVAILABLE UNTIL YOU ARE ABLE TO REACH US. IT IS MOST IMPORTANT TO KEEP THE WOUND COVERED AT ALL TIMES.      Physician Signature:_______________________    Date: ___________ Time:  ____________

## 2023-02-23 NOTE — WOUND CARE
[address]   History and Physical Note     [address] [facility] RECORD NUMBER:  [id number]  AGE: 81 y.o. GENDER: female  : 1940  EPISODE DATE:  2023    Subjective:   Right foot dorsal aspect blister          HISTORY of PRESENT ILLNESS HPI     Zulay Crowell is a 81 y.o. female who presents today for an initial evaluation of a wound/ulcer. Patient is returning to the wound center for a recurrent or new wound. Wound duration:  2 week(s). She presents today with right foot dorsal aspect blister, along the MTP joint. She has developed LE edema and was placed on lasix by PCP, this was stopped due to worsening of her renal function. She was placed back on lasix due to edema. Pertinent associated symptoms: drainage  and swelling    PAST MEDICAL HISTORY        Diagnosis Date    Arthritis     Chronic obstructive pulmonary disease (HCC)     Gout     Hypercholesterolemia     Hypertension 1971    Ill-defined condition     shortness of breath with exercise    Tinnitus     Unspecified sleep apnea     does not use CPAP       PAST SURGICAL HISTORY    Past Surgical History:   Procedure Laterality Date    BREAST BIOPSY Left     CATARACT REMOVAL Bilateral     DILATION AND CURETTAGE OF UTERUS      TOTAL KNEE ARTHROPLASTY Left 2017       FAMILY HISTORY    Family History   Problem Relation Age of Onset    Stroke Mother        SOCIAL HISTORY    Social History     Tobacco Use    Smoking status: Former     Types: Cigarettes     Quit date: 10/1/1962     Years since quittin.4    Smokeless tobacco: Never   Substance Use Topics    Alcohol use: Not Currently    Drug use: No       ALLERGIES    Not on File    MEDICATIONS    No current outpatient medications on file prior to encounter. No current facility-administered medications on file prior to encounter.        REVIEW OF SYSTEMS  A comprehensive review of systems was negative except for: A comprehensive review of systems was negative. Objective:      /60   Pulse 84   Temp 98.6 °F (37 °C) (Oral)   Resp 18     Wt Readings from Last 3 Encounters:   No data found for Wt       PHYSICAL EXAM  General Appearance/Constitutional: alert and oriented to person, place and time, well-developed and well nourished, and in no acute distress. Nontoxic. Skin: warm and dry, no rash, positive wound per LDA documentation. Head: normocephalic and atraumatic. Eyes: extraocular eye movements intact, conjunctivae normal, and sclera anicteric. ENT: hearing grossly normal bilaterally. Normal appearance. Pulmonary/Chest: no chest wall tenderness and clear anteriorly. No respiratory distress. Cardiovascular: normal rate and regular rhythm. GI: abdomen soft, non-tender and non-distended. Musculoskeletal: normal range of motion of joints. Nontender calves. No cyanosis. Nontender calves. Edema 2+  Neurologic: no gross cranial nerve deficit and speech normal. No focal deficits. Mental status normal.      Assessment:     Medical Decision Making:    Problems addressed today:  Acute Wound: Blister     Leg edema/venous stasis and insufficiency    Problem List Items Addressed This Visit          Other    Blister of right foot    Edema   Blister of right foot - O67.036X  Edema - R60.9    Historians for health history include: patient . Ulcer Identification:  Ulcer Type:  Blister    Contributing Factors: edema        Patient is at minimal risk for morbidity and mortality due to conditions affecting wound healing and addressed today: Per contributing factors above  Education and discussion held with patient regarding these disease processes pertinent to wound(s). Wound evaluation: Measurements and descriptions per wound LDA. Overall Wound(s) appears noninfected.     Plan:     Wound/Ulcer Descriptions are Pre-procedure except post procedure measurements:  Procedures done this visit: (debridement, biopsy, skin substitute application, chemical cauterization, incision and drainage, total contact cast application, multilayer compression application)  Wound 02/22/23 Foot Anterior;Right Blister (Active)   Wound Etiology Arterial 02/22/23 1522   Dressing Status New dressing applied 02/22/23 1522   Wound Cleansed Wound cleanser 02/22/23 1522   Dressing/Treatment Xeroform;Dry dressing;Roll gauze;Tubular bandage 02/22/23 1522   Offloading for Diabetic Foot Ulcers Offloading not required 02/22/23 1522   Dressing Change Due 03/02/23 02/22/23 1522   Wound Length (cm) 2 cm 02/22/23 1522   Wound Width (cm) 4.6 cm 02/22/23 1522   Wound Surface Area (cm^2) 9.2 cm^2 02/22/23 1522   Drainage Amount Small 02/22/23 1522   Number of days: 0          Wound care:   Offloading: Avoid pressure and trauma to wound. pressure relief soft boot or shoe.  Edema control: knee-high gradient compression stockings, avoid prolonged standing in one place , and elevate legs above the level of the heart when sitting.  Infection: no signs of acute infection and leave the blister intact . Continue to monitor.  Circulation: Patient does nothave signs of decreased outflow. Circulatory testing: N/A. Continue to monitor.  Nutrition:  good nutrition .  All other pertinent records in [facility] and through care everywhere reviewedYes.  Wound Progress follow up next visit    Written patient dismissal instructions given to patient and signed by patient or POA.         TIME: E/M Time spent with patient and patient care issues: [] 15-20 min  [] 21-30 min  [x] 31-44 min  [] 45 min or more.   This is above the usual time needed to address patient's chief complaint today: [] Yes  [x] No  This time also includes physician non-face-to-face service time visit on the date of service such as  Preparing to see the patient (eg, review of tests)  Obtaining and/or reviewing separately obtained history  Performing a medically necessary appropriate examination and/or evaluation  Counseling and educating the  patient/family/caregiver  Ordering medications, tests, or procedures  Referring and communicating with other health care professionals as needed  Documenting clinical information in the electronic or other health record  Independently interpreting results (not reported separately) and communicating results to the patient/family/caregiver  Care coordination (not reported separately)        Electronically signed by Maya Sinclair MD on 2/23/2023 at 2:20 PM

## 2023-03-02 ENCOUNTER — HOSPITAL ENCOUNTER (OUTPATIENT)
Facility: HOSPITAL | Age: 83
Discharge: HOME | End: 2023-03-02
Payer: MEDICARE

## 2023-03-02 VITALS
RESPIRATION RATE: 18 BRPM | OXYGEN SATURATION: 100 % | DIASTOLIC BLOOD PRESSURE: 70 MMHG | TEMPERATURE: 97.8 F | HEART RATE: 79 BPM | SYSTOLIC BLOOD PRESSURE: 117 MMHG

## 2023-03-02 DIAGNOSIS — S90.821D BLISTER OF RIGHT FOOT, SUBSEQUENT ENCOUNTER: Primary | ICD-10-CM

## 2023-03-02 DIAGNOSIS — S90.821A BLISTER OF RIGHT FOOT, INITIAL ENCOUNTER: ICD-10-CM

## 2023-03-02 DIAGNOSIS — R60.9 EDEMA, UNSPECIFIED TYPE: ICD-10-CM

## 2023-03-02 PROCEDURE — 99213 OFFICE O/P EST LOW 20 MIN: CPT

## 2023-03-02 RX ORDER — BACITRACIN, NEOMYCIN, POLYMYXIN B 400; 3.5; 5 [USP'U]/G; MG/G; [USP'U]/G
OINTMENT TOPICAL ONCE
OUTPATIENT
Start: 2023-03-02 | End: 2023-03-02

## 2023-03-02 RX ORDER — LIDOCAINE 50 MG/G
OINTMENT TOPICAL ONCE
OUTPATIENT
Start: 2023-03-02 | End: 2023-03-02

## 2023-03-02 RX ORDER — GINSENG 100 MG
CAPSULE ORAL ONCE
OUTPATIENT
Start: 2023-03-02 | End: 2023-03-02

## 2023-03-02 RX ORDER — LIDOCAINE HYDROCHLORIDE 20 MG/ML
JELLY TOPICAL ONCE
OUTPATIENT
Start: 2023-03-02 | End: 2023-03-02

## 2023-03-02 RX ORDER — LIDOCAINE HYDROCHLORIDE 40 MG/ML
SOLUTION TOPICAL ONCE
OUTPATIENT
Start: 2023-03-02 | End: 2023-03-02

## 2023-03-02 RX ORDER — LIDOCAINE 40 MG/G
CREAM TOPICAL ONCE
OUTPATIENT
Start: 2023-03-02 | End: 2023-03-02

## 2023-03-02 RX ORDER — CLOBETASOL PROPIONATE 0.5 MG/G
OINTMENT TOPICAL ONCE
OUTPATIENT
Start: 2023-03-02 | End: 2023-03-02

## 2023-03-02 RX ORDER — GENTAMICIN SULFATE 1 MG/G
OINTMENT TOPICAL ONCE
OUTPATIENT
Start: 2023-03-02 | End: 2023-03-02

## 2023-03-02 RX ORDER — BACITRACIN ZINC AND POLYMYXIN B SULFATE 500; 1000 [USP'U]/G; [USP'U]/G
OINTMENT TOPICAL ONCE
OUTPATIENT
Start: 2023-03-02 | End: 2023-03-02

## 2023-03-02 RX ORDER — BETAMETHASONE DIPROPIONATE 0.05 %
OINTMENT (GRAM) TOPICAL ONCE
OUTPATIENT
Start: 2023-03-02 | End: 2023-03-02

## 2023-03-02 ASSESSMENT — PAIN SCALES - GENERAL: PAINLEVEL_OUTOF10: 0

## 2023-03-02 NOTE — FLOWSHEET NOTE
03/02/23 1703   Wound 02/22/23 Foot Anterior;Right Blister   Date First Assessed/Time First Assessed: 02/22/23 1521   Present on Hospital Admission: Yes  Primary Wound Type: (c) Other (comment)  Location: Foot  Wound Location Orientation: Anterior;Right  Wound Description (Comments): Blister   Wound Image    Wound Etiology Venous   Dressing Status Other (Comment)  (tubigrip applied)   Wound Cleansed Wound cleanser   Dressing/Treatment   (tubigrip size E)   Dressing Change Due   (discharged from clinic)   Wound Length (cm)   (wound healed)   Oscar Scale   Sensory Perceptions 4   Moisture 3   Activity 3   Mobility 3   Nutrition 4   Friction and Shear 3   Oscar Scale Score 20   Wound Follow Up   Require Follow Up No     Discharged from Clinic

## 2023-03-03 NOTE — WOUND CARE
[address] [facility]   History and Physical Note     [address] [facility] RECORD NUMBER:  [id number]  AGE: 81 y.o. GENDER: female  : 1940  EPISODE DATE:  3/2/2023    Subjective:   Right foot dorsal aspect blister          HISTORY of PRESENT ILLNESS HPI     Talia Casarez is a 81 y.o. female who presents today for an initial evaluation of a wound/ulcer. Patient is returning to the wound center for a recurrent or new wound. Wound duration:  2 week(s). She presents today with right foot dorsal aspect blister, along the MTP joint. She has developed LE edema and was placed on lasix by PCP, this was stopped due to worsening of her renal function. She was placed back on lasix due to edema.      Pertinent associated symptoms: drainage  and swelling    PAST MEDICAL HISTORY        Diagnosis Date    Arthritis     Chronic obstructive pulmonary disease ([address]     Gout     Hypercholesterolemia     Hypertension 1971    Ill-defined condition     shortness of breath with exercise    Tinnitus     Unspecified sleep apnea     does not use CPAP       PAST SURGICAL HISTORY    Past Surgical History:   Procedure Laterality Date    BREAST BIOPSY Left     CATARACT REMOVAL Bilateral     DILATION AND CURETTAGE OF UTERUS      TOTAL KNEE ARTHROPLASTY Left 2017       FAMILY HISTORY    Family History   Problem Relation Age of Onset    Stroke Mother        SOCIAL HISTORY    Social History     Tobacco Use    Smoking status: Former     Types: Cigarettes     Quit date: 10/1/1962     Years since quittin.4    Smokeless tobacco: Never   Substance Use Topics    Alcohol use: Not Currently    Drug use: No       ALLERGIES    Allergies   Allergen Reactions    Elemental Sulfur Hives       MEDICATIONS    Current Outpatient Medications on File Prior to Encounter   Medication Sig Dispense Refill    allopurinol (ZYLOPRIM) 300 MG tablet       ELIQUIS 5 MG TABS tablet TAKE 1 TABLET BY MOUTH TWICE DAILY      furosemide (LASIX) 40 MG tablet TAKE 1 TABLET BY MOUTH EVERY DAY AS NEEDED FOR LEG SWELLING      metoprolol (LOPRESSOR) 100 MG tablet Take 100 mg by mouth 2 times daily      ursodiol (ACTIGALL) 500 MG tablet Ursodiol Oral    QD    active      spironolactone (ALDACTONE) 25 MG tablet       TRELEGY ELLIPTA 100-62.5-25 MCG/ACT AEPB inhaler       cloNIDine (CATAPRES) 0.2 MG tablet       dilTIAZem (CARDIZEM CD) 240 MG extended release capsule       vitamin D 25 MCG (1000 UT) CAPS Take 1,000 Units by mouth daily      aspirin 81 MG EC tablet Take 1 tablet by mouth daily       No current facility-administered medications on file prior to encounter. REVIEW OF SYSTEMS  A comprehensive review of systems was negative except for: A comprehensive review of systems was negative. Objective:      /70   Pulse 79   Temp 97.8 °F (36.6 °C) (Oral)   Resp 18   SpO2 100%     Wt Readings from Last 3 Encounters:   No data found for Wt       PHYSICAL EXAM  General Appearance/Constitutional: alert and oriented to person, place and time, well-developed and well nourished, and in no acute distress. Nontoxic. Skin: warm and dry, no rash, positive wound per LDA documentation. Head: normocephalic and atraumatic. Eyes: extraocular eye movements intact, conjunctivae normal, and sclera anicteric. ENT: hearing grossly normal bilaterally. Normal appearance. Pulmonary/Chest: no chest wall tenderness and clear anteriorly. No respiratory distress. Cardiovascular: normal rate and regular rhythm. GI: abdomen soft, non-tender and non-distended. Musculoskeletal: normal range of motion of joints. Nontender calves. No cyanosis. Nontender calves. Edema 2+  Neurologic: no gross cranial nerve deficit and speech normal. No focal deficits.  Mental status normal.      Assessment:     Medical Decision Making:    Problems addressed today:  Acute Wound: Blister     Leg edema/venous stasis and insufficiency    Now healed    Problem List Items Addressed This Visit          Other    Blister of right foot - Primary    Edema   Blister of right foot - S90.821A  Edema - R60.9    Historians for health history include: patient .     Ulcer Identification:  Ulcer Type:  Blister    Contributing Factors: edema        Patient is at minimal risk for morbidity and mortality due to conditions affecting wound healing and addressed today: Per contributing factors above  Education and discussion held with patient regarding these disease processes pertinent to wound(s).     Wound evaluation: Measurements and descriptions per wound LDA.  Overall Wound(s) appears noninfected.    Plan:     Wound/Ulcer Descriptions are Pre-procedure except post procedure measurements:  Procedures done this visit: (debridement, biopsy, skin substitute application, chemical cauterization, incision and drainage, total contact cast application, multilayer compression application)  Wound 02/22/23 Foot Anterior;Right Blister (Active)   Wound Image   03/02/23 1703   Wound Etiology Venous 03/02/23 1703   Dressing Status Other (Comment) 03/02/23 1703   Wound Cleansed Wound cleanser 03/02/23 1703   Dressing/Treatment Xeroform;Dry dressing;Roll gauze;Tubular bandage 02/22/23 1522   Offloading for Diabetic Foot Ulcers Offloading not required 02/22/23 1522   Dressing Change Due 03/02/23 02/22/23 1522   Wound Length (cm) 2 cm 02/22/23 1522   Wound Width (cm) 4.6 cm 02/22/23 1522   Wound Surface Area (cm^2) 9.2 cm^2 02/22/23 1522   Drainage Amount Small 02/22/23 1522   Drainage Description Serous 02/22/23 1522   Odor None 02/22/23 1522   Dali-wound Assessment Fragile 02/22/23 1522   Margins Attached edges 02/22/23 1522   Number of days: 8          Wound care:   Offloading: Avoid pressure and trauma to wound. pressure relief soft boot or shoe.  Edema control: knee-high gradient compression stockings, avoid prolonged standing in one place , and elevate legs above the level of the heart when sitting.  Infection:   Circulation:  Patient does not have signs of decreased outflow. Circulatory testing: N/A. Continue to monitor. Nutrition:  good nutrition . All other pertinent records in [facility] and through care everywhere reviewedYes. Wound Progress follow up as needed    Written patient dismissal instructions given to patient and signed by patient or POA. TIME: E/M Time spent with patient and patient care issues: [] 15-20 min  [x] 21-30 min  [] 31-44 min  [] 45 min or more.      This time also includes physician non-face-to-face service time visit on the date of service such as  Preparing to see the patient (eg, review of tests)  Obtaining and/or reviewing separately obtained history  Performing a medically necessary appropriate examination and/or evaluation  Counseling and educating the patient/family/caregiver  Ordering medications, tests, or procedures  Referring and communicating with other health care professionals as needed  Documenting clinical information in the electronic or other health record  Independently interpreting results (not reported separately) and communicating results to the patient/family/caregiver  Care coordination (not reported separately)        Electronically signed by Nathalie Maxwell MD on 3/3/2023 at 10:33 AM

## (undated) DEVICE — ANGIOGRAPHIC CATHETER: Brand: EXPO™

## (undated) DEVICE — GLIDESHEATH SLENDER STAINLESS STEEL KIT: Brand: GLIDESHEATH SLENDER

## (undated) DEVICE — BAND RADIAL COMPR ARTERY 24CM -- REG BX/10

## (undated) DEVICE — DRAPE,ANGIO,BRACH,STERILE,38X44: Brand: MEDLINE

## (undated) DEVICE — PRESSURE MONITORING SET: Brand: TRUWAVE

## (undated) DEVICE — Device

## (undated) DEVICE — SWAN-GANZ POLYMER BLEND TRUE SIZE C-TIP CONTROLCATH TD CATHETER: Brand: SWAN-GANZ CONTROLCATH TRUE SIZE

## (undated) DEVICE — SENSOR PLSE OXMTR AD CBL L36IN ADH FRM FIT SPO2 DISP

## (undated) DEVICE — PROCEDURE KIT FLUID MGMT 10 FR CUST MAINFOLD

## (undated) DEVICE — GUIDEWIRE VASC L260CM DIA0.035IN RAD 3MM J TIP L7CM PTFE

## (undated) DEVICE — STOPCOCK TRNSDUC 500PSI 3 W ROT M LUER LT BLU OFF HNDL R

## (undated) DEVICE — PACK PROCEDURE SURG CATH CUST

## (undated) DEVICE — GUIDEWIRE VASC L260CM DIA0.035IN TIP L3MM STD EXCHG PTFE J

## (undated) DEVICE — TUBING PRSS MON L24IN PVC RIG NONEXPANDING M TO FEM CONN

## (undated) DEVICE — SHIELD RAD 14X16 IN W/ SCOOP ABSORBER